# Patient Record
Sex: FEMALE | Race: AMERICAN INDIAN OR ALASKA NATIVE | Employment: UNEMPLOYED | URBAN - METROPOLITAN AREA
[De-identification: names, ages, dates, MRNs, and addresses within clinical notes are randomized per-mention and may not be internally consistent; named-entity substitution may affect disease eponyms.]

---

## 2017-03-09 ENCOUNTER — HOSPITAL ENCOUNTER (EMERGENCY)
Facility: CLINIC | Age: 37
Discharge: LEFT BEFORE DISCHARGE | End: 2017-03-09
Attending: EMERGENCY MEDICINE | Admitting: EMERGENCY MEDICINE

## 2017-03-09 VITALS
RESPIRATION RATE: 16 BRPM | SYSTOLIC BLOOD PRESSURE: 165 MMHG | TEMPERATURE: 98 F | BODY MASS INDEX: 43.58 KG/M2 | DIASTOLIC BLOOD PRESSURE: 92 MMHG | HEART RATE: 82 BPM | OXYGEN SATURATION: 97 % | WEIGHT: 270 LBS

## 2017-03-09 DIAGNOSIS — L03.211 FACIAL CELLULITIS: ICD-10-CM

## 2017-03-09 DIAGNOSIS — K08.89 PAIN, DENTAL: ICD-10-CM

## 2017-03-09 LAB
ANION GAP SERPL CALCULATED.3IONS-SCNC: 8 MMOL/L (ref 3–14)
BASOPHILS # BLD AUTO: 0 10E9/L (ref 0–0.2)
BASOPHILS NFR BLD AUTO: 0.1 %
BUN SERPL-MCNC: 8 MG/DL (ref 7–30)
CALCIUM SERPL-MCNC: 8.7 MG/DL (ref 8.5–10.1)
CHLORIDE SERPL-SCNC: 105 MMOL/L (ref 94–109)
CO2 SERPL-SCNC: 27 MMOL/L (ref 20–32)
CREAT SERPL-MCNC: 0.5 MG/DL (ref 0.52–1.04)
DIFFERENTIAL METHOD BLD: NORMAL
EOSINOPHIL # BLD AUTO: 0.2 10E9/L (ref 0–0.7)
EOSINOPHIL NFR BLD AUTO: 3.5 %
ERYTHROCYTE [DISTWIDTH] IN BLOOD BY AUTOMATED COUNT: 12.6 % (ref 10–15)
GFR SERPL CREATININE-BSD FRML MDRD: ABNORMAL ML/MIN/1.7M2
GLUCOSE SERPL-MCNC: 111 MG/DL (ref 70–99)
HCT VFR BLD AUTO: 40.6 % (ref 35–47)
HGB BLD-MCNC: 14.4 G/DL (ref 11.7–15.7)
IMM GRANULOCYTES # BLD: 0 10E9/L (ref 0–0.4)
IMM GRANULOCYTES NFR BLD: 0.1 %
LYMPHOCYTES # BLD AUTO: 1.2 10E9/L (ref 0.8–5.3)
LYMPHOCYTES NFR BLD AUTO: 18.1 %
MCH RBC QN AUTO: 32.4 PG (ref 26.5–33)
MCHC RBC AUTO-ENTMCNC: 35.5 G/DL (ref 31.5–36.5)
MCV RBC AUTO: 91 FL (ref 78–100)
MONOCYTES # BLD AUTO: 0.6 10E9/L (ref 0–1.3)
MONOCYTES NFR BLD AUTO: 8.2 %
NEUTROPHILS # BLD AUTO: 4.7 10E9/L (ref 1.6–8.3)
NEUTROPHILS NFR BLD AUTO: 70 %
NRBC # BLD AUTO: 0 10*3/UL
NRBC BLD AUTO-RTO: 0 /100
PLATELET # BLD AUTO: 247 10E9/L (ref 150–450)
POTASSIUM SERPL-SCNC: 3.3 MMOL/L (ref 3.4–5.3)
RBC # BLD AUTO: 4.45 10E12/L (ref 3.8–5.2)
SODIUM SERPL-SCNC: 140 MMOL/L (ref 133–144)
WBC # BLD AUTO: 6.8 10E9/L (ref 4–11)

## 2017-03-09 PROCEDURE — 99285 EMERGENCY DEPT VISIT HI MDM: CPT | Mod: 25

## 2017-03-09 PROCEDURE — 85025 COMPLETE CBC W/AUTO DIFF WBC: CPT | Performed by: EMERGENCY MEDICINE

## 2017-03-09 PROCEDURE — 80048 BASIC METABOLIC PNL TOTAL CA: CPT | Performed by: EMERGENCY MEDICINE

## 2017-03-09 PROCEDURE — 25000128 H RX IP 250 OP 636: Performed by: EMERGENCY MEDICINE

## 2017-03-09 PROCEDURE — 96374 THER/PROPH/DIAG INJ IV PUSH: CPT

## 2017-03-09 PROCEDURE — 96375 TX/PRO/DX INJ NEW DRUG ADDON: CPT

## 2017-03-09 RX ORDER — MORPHINE SULFATE 4 MG/ML
4 INJECTION, SOLUTION INTRAMUSCULAR; INTRAVENOUS
Status: DISCONTINUED | OUTPATIENT
Start: 2017-03-09 | End: 2017-03-09 | Stop reason: HOSPADM

## 2017-03-09 RX ORDER — IOPAMIDOL 755 MG/ML
90 INJECTION, SOLUTION INTRAVASCULAR ONCE
Status: DISCONTINUED | OUTPATIENT
Start: 2017-03-09 | End: 2017-03-09 | Stop reason: HOSPADM

## 2017-03-09 RX ORDER — KETOROLAC TROMETHAMINE 30 MG/ML
30 INJECTION, SOLUTION INTRAMUSCULAR; INTRAVENOUS ONCE
Status: COMPLETED | OUTPATIENT
Start: 2017-03-09 | End: 2017-03-09

## 2017-03-09 RX ORDER — AMPICILLIN AND SULBACTAM 2; 1 G/1; G/1
3 INJECTION, POWDER, FOR SOLUTION INTRAMUSCULAR; INTRAVENOUS ONCE
Status: DISCONTINUED | OUTPATIENT
Start: 2017-03-09 | End: 2017-03-09 | Stop reason: HOSPADM

## 2017-03-09 RX ADMIN — KETOROLAC TROMETHAMINE 30 MG: 30 INJECTION, SOLUTION INTRAMUSCULAR at 02:26

## 2017-03-09 RX ADMIN — MORPHINE SULFATE 4 MG: 4 INJECTION, SOLUTION INTRAMUSCULAR; INTRAVENOUS at 02:31

## 2017-03-09 ASSESSMENT — ENCOUNTER SYMPTOMS
EYE PAIN: 0
TROUBLE SWALLOWING: 0
SHORTNESS OF BREATH: 0
FACIAL SWELLING: 1

## 2017-03-09 NOTE — ED NOTES
"Attempted to administer antibiotics.  Patient has been on cellphone entire time.  Requesting writer to talk on phone to her Auntie who she says is in the medical field.  States \"I don't want any tests done.  This is crap.  I don't have anything wrong with me.  It is just an abscess.  Just give me the antibiotics so I can go but first you need to talk to her so she can tell you how to treat me cause she has been treating me at home\".  Writer declined to speak on cell phone.  Explained I will discuss her plan of care with her only as she is the patient.    Of note - Patient has been restless & fidgety during entire stay in ED.  Declined to change into patient gown.  States, \"I refuse.  Just start an IV right here, this is where they always put it\"  (points to left hand).  "
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
## 2017-03-09 NOTE — ED NOTES
IV catheter lying on bedside table & patient is not in room.  All belongings are gone as well.  Patient was not observed leaving the ED department.

## 2017-06-10 ENCOUNTER — HEALTH MAINTENANCE LETTER (OUTPATIENT)
Age: 37
End: 2017-06-10

## 2019-09-30 ENCOUNTER — HEALTH MAINTENANCE LETTER (OUTPATIENT)
Age: 39
End: 2019-09-30

## 2021-01-15 ENCOUNTER — HEALTH MAINTENANCE LETTER (OUTPATIENT)
Age: 41
End: 2021-01-15

## 2021-10-24 ENCOUNTER — HEALTH MAINTENANCE LETTER (OUTPATIENT)
Age: 41
End: 2021-10-24

## 2022-02-13 ENCOUNTER — HEALTH MAINTENANCE LETTER (OUTPATIENT)
Age: 42
End: 2022-02-13

## 2022-10-15 ENCOUNTER — HEALTH MAINTENANCE LETTER (OUTPATIENT)
Age: 42
End: 2022-10-15

## 2023-03-26 ENCOUNTER — HEALTH MAINTENANCE LETTER (OUTPATIENT)
Age: 43
End: 2023-03-26

## 2024-07-24 ENCOUNTER — TRANSFERRED RECORDS (OUTPATIENT)
Dept: HEALTH INFORMATION MANAGEMENT | Facility: CLINIC | Age: 44
End: 2024-07-24

## 2024-10-04 ENCOUNTER — OFFICE VISIT (OUTPATIENT)
Dept: URGENT CARE | Facility: URGENT CARE | Age: 44
End: 2024-10-04
Payer: COMMERCIAL

## 2024-10-04 VITALS
OXYGEN SATURATION: 95 % | TEMPERATURE: 98.7 F | DIASTOLIC BLOOD PRESSURE: 99 MMHG | WEIGHT: 274 LBS | RESPIRATION RATE: 18 BRPM | BODY MASS INDEX: 44.22 KG/M2 | HEART RATE: 87 BPM | SYSTOLIC BLOOD PRESSURE: 163 MMHG

## 2024-10-04 DIAGNOSIS — I80.8 SUPERFICIAL PHLEBITIS OF ARM: Primary | ICD-10-CM

## 2024-10-04 DIAGNOSIS — F19.90 ACTIVE INTRAVENOUS DRUG USE: ICD-10-CM

## 2024-10-04 PROCEDURE — 99204 OFFICE O/P NEW MOD 45 MIN: CPT | Performed by: FAMILY MEDICINE

## 2024-10-04 RX ORDER — SULFAMETHOXAZOLE AND TRIMETHOPRIM 800; 160 MG/1; MG/1
1 TABLET ORAL 2 TIMES DAILY
Qty: 10 TABLET | Refills: 0 | Status: SHIPPED | OUTPATIENT
Start: 2024-10-04 | End: 2024-10-09
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
## 2024-10-13 ENCOUNTER — HEALTH MAINTENANCE LETTER (OUTPATIENT)
Age: 44
End: 2024-10-13

## 2024-12-31 LAB — PAP SMEAR - HIM PATIENT REPORTED: NORMAL

## 2025-01-01 ENCOUNTER — TRANSFERRED RECORDS (OUTPATIENT)
Dept: MULTI SPECIALTY CLINIC | Facility: CLINIC | Age: 45
End: 2025-01-01

## 2025-01-01 LAB — RETINOPATHY: NORMAL

## 2025-02-25 ENCOUNTER — OFFICE VISIT (OUTPATIENT)
Dept: URGENT CARE | Facility: URGENT CARE | Age: 45
End: 2025-02-25
Payer: COMMERCIAL

## 2025-02-25 VITALS
TEMPERATURE: 98.3 F | WEIGHT: 274 LBS | RESPIRATION RATE: 18 BRPM | DIASTOLIC BLOOD PRESSURE: 68 MMHG | SYSTOLIC BLOOD PRESSURE: 141 MMHG | HEART RATE: 76 BPM | BODY MASS INDEX: 44.22 KG/M2 | OXYGEN SATURATION: 97 %

## 2025-02-25 DIAGNOSIS — R20.0 NUMBNESS AND TINGLING OF BOTH FEET: ICD-10-CM

## 2025-02-25 DIAGNOSIS — R20.2 NUMBNESS AND TINGLING OF BOTH FEET: ICD-10-CM

## 2025-02-25 DIAGNOSIS — L08.9 TOE INFECTION: Primary | ICD-10-CM

## 2025-02-25 PROCEDURE — 99213 OFFICE O/P EST LOW 20 MIN: CPT

## 2025-02-25 PROCEDURE — 3077F SYST BP >= 140 MM HG: CPT

## 2025-02-25 PROCEDURE — 3078F DIAST BP <80 MM HG: CPT

## 2025-02-25 RX ORDER — CAPSAICIN 0.025 %
1 CREAM (GRAM) TOPICAL 3 TIMES DAILY
Qty: 120 G | Refills: 0 | Status: SHIPPED | OUTPATIENT
Start: 2025-02-25

## 2025-02-25 RX ORDER — DOXYCYCLINE HYCLATE 100 MG
100 TABLET ORAL 2 TIMES DAILY
Qty: 14 TABLET | Refills: 0 | Status: SHIPPED | OUTPATIENT
Start: 2025-02-25 | End: 2025-03-04

## 2025-02-25 NOTE — PROGRESS NOTES
"Assessment & Plan   (L08.9) Toe infection  (primary encounter diagnosis)  Plan: doxycycline hyclate (VIBRA-TABS) 100 MG tablet,        Orthopedic  Referral    (R20.0,  R20.2) Numbness and tingling of both feet  Plan: capsaicin (ZOSTRIX) 0.025 % external cream    Informed the patient to take the antibiotic as prescribed and finish the full course even if symptoms improve.  We discussed trying yogurt with active cultures or probiotic such as Culturelle daily to help with the diarrhea while taking the antibiotic.  We also discussed using the capsaicin cream as needed for the numbness and tingling in the feet.  Informed the patient to follow-up with podiatry for further evaluation and treatment.  Patient acknowledged her understanding of the above plan.    The use of Dragon/[employer] services may have been used to construct the content in this note; any grammatical or spelling errors are non-intentional. Please contact the author of this note directly if you are in need of any clarification.      DINH Shepard [facility] U[facility]ENT [facility] SIMBA Casillas is a 45 year old female who presents to clinic today for the following health issues:  Chief Complaint   Patient presents with    Urgent Care    Foot Problems     Hx of neuropathy of foot, Pt concerned about gangrene on bilateral toes, discoloration, swelling and pain since December   No history of diabetes   Hx of alcoholism     HPI  Patient has had \"nerve pain\" on her feet since December.  More recently she has noticed blisters on her bilateral big toes and is concerned about possible infection.    ROS:  Negative except noted above.    Review of Systems        Objective    BP (!) 141/68 (BP Location: Left arm, Patient Position: Sitting, Cuff Size: Adult Large)   Pulse 76   Temp 98.3  F (36.8  C) (Tympanic)   Resp 18   Wt 124.3 kg (274 lb)   LMP 02/15/2025 (Within Days)   SpO2 97%   Breastfeeding No   BMI " 44.22 kg/m    Physical Exam   GENERAL: alert and no distress  SKIN: blistering on the distal aspect of bilateral toes.  CMS intact

## 2025-02-25 NOTE — PATIENT INSTRUCTIONS
Take the antibiotic as prescribed and finish the full course even if symptoms improve.  Try yogurt with active cultures or probiotics such as Culturelle daily to help prevent diarrhea while using antibiotics.  Use the capsaicin cream as needed for the numbness and tingling in the feet.  Follow up with podiatry for further evaluation and treatment.

## 2025-02-27 ENCOUNTER — OFFICE VISIT (OUTPATIENT)
Dept: PODIATRY | Facility: CLINIC | Age: 45
End: 2025-02-27
Payer: COMMERCIAL

## 2025-02-27 VITALS — HEART RATE: 97 BPM | BODY MASS INDEX: 44.22 KG/M2 | OXYGEN SATURATION: 98 % | WEIGHT: 274 LBS

## 2025-02-27 DIAGNOSIS — M32.9 SYSTEMIC LUPUS ERYTHEMATOSUS, UNSPECIFIED SLE TYPE, UNSPECIFIED ORGAN INVOLVEMENT STATUS (H): ICD-10-CM

## 2025-02-27 DIAGNOSIS — R23.4 ESCHAR: ICD-10-CM

## 2025-02-27 DIAGNOSIS — E11.9 TYPE 2 DIABETES MELLITUS WITHOUT COMPLICATION, WITHOUT LONG-TERM CURRENT USE OF INSULIN (H): Primary | ICD-10-CM

## 2025-02-27 PROBLEM — K21.9 GASTROESOPHAGEAL REFLUX DISEASE WITHOUT ESOPHAGITIS: Status: ACTIVE | Noted: 2021-01-12

## 2025-02-27 PROBLEM — L03.113 RIGHT ARM CELLULITIS: Status: ACTIVE | Noted: 2024-03-02

## 2025-02-27 PROBLEM — T14.91XA SUICIDE ATTEMPT (H): Status: ACTIVE | Noted: 2021-01-12

## 2025-02-27 PROBLEM — M25.561 CHRONIC PAIN OF BOTH KNEES: Status: ACTIVE | Noted: 2020-11-16

## 2025-02-27 PROBLEM — F25.0 SCHIZOAFFECTIVE DISORDER, BIPOLAR TYPE (H): Status: ACTIVE | Noted: 2017-05-21

## 2025-02-27 PROBLEM — F32.A DEPRESSION, ACUTE: Status: ACTIVE | Noted: 2017-05-21

## 2025-02-27 PROBLEM — G47.33 OSA (OBSTRUCTIVE SLEEP APNEA): Status: ACTIVE | Noted: 2021-01-12

## 2025-02-27 PROBLEM — N05.2 MEMBRANOUS GLOMERULONEPHRITIS: Status: ACTIVE | Noted: 2019-05-01

## 2025-02-27 PROBLEM — M25.562 CHRONIC PAIN OF BOTH KNEES: Status: ACTIVE | Noted: 2020-11-16

## 2025-02-27 PROBLEM — G89.29 CHRONIC PAIN OF BOTH KNEES: Status: ACTIVE | Noted: 2020-11-16

## 2025-02-27 PROBLEM — R52 INTRACTABLE PAIN: Status: ACTIVE | Noted: 2021-03-14

## 2025-02-27 PROBLEM — F15.10 METHAMPHETAMINE USE (H): Status: ACTIVE | Noted: 2024-03-01

## 2025-02-27 PROBLEM — L03.90 CELLULITIS: Status: ACTIVE | Noted: 2024-03-01

## 2025-02-27 RX ORDER — OMEPRAZOLE 20 MG/1
20 CAPSULE, DELAYED RELEASE ORAL
COMMUNITY

## 2025-02-27 RX ORDER — VITAMIN B COMPLEX
2000 TABLET ORAL DAILY
COMMUNITY

## 2025-02-27 RX ORDER — AMLODIPINE BESYLATE 5 MG/1
5 TABLET ORAL DAILY
COMMUNITY

## 2025-02-27 NOTE — PATIENT INSTRUCTIONS
We wish you continued good healing. If you have any questions or concerns, please do not hesitate to contact us at  [phone]    [employer] (secure e-mail communication and access to your chart) to send a message or to make an appointment.    Please remember to call and schedule a follow up appointment if one was recommended at your earliest convenience.     PODIATRY CLINIC HOURS  TELEPHONE NUMBER    Dr. Nick SOLISPOSCAR FACFAS        Clinics:  [facility]   [place]  Tuesday 1PM-6PM  [place]  Wednesday 745AM-330PM  [place]  Monday 2nd,4th  830AM-4PM  Thursday/Friday 745AM-230PM     [place] APPOINTMENTS  [phone]    [place] APPOINTMENTS  [phone]          If you need a medication refill, please contact us you may need lab work and/or a follow up visit prior to your refill (i.e. Antifungal medications).  If MRI needed please call Imaging at [phone]   HOW DO I GET MY KNEE SCOOTER? Knee scooters can be picked up at ANY Medical Supply stores with your knee scooter Prescription.  OR  Bring your signed prescription to an [facility] showroom.   Set up an appointment for your custom Orthotics. Call any Orthotics locations call [phone]
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
## 2025-03-05 ENCOUNTER — OFFICE VISIT (OUTPATIENT)
Dept: FAMILY MEDICINE | Facility: CLINIC | Age: 45
End: 2025-03-05
Payer: COMMERCIAL

## 2025-03-05 DIAGNOSIS — Z53.9 NO SHOW: Primary | ICD-10-CM

## 2025-03-05 PROCEDURE — 99207 PR NO SHOW FOR SCHEDULED APPT: CPT | Performed by: INTERNAL MEDICINE

## 2025-04-04 ENCOUNTER — ORDERS ONLY (AUTO-RELEASED) (OUTPATIENT)
Dept: FAMILY MEDICINE | Facility: CLINIC | Age: 45
End: 2025-04-04
Payer: COMMERCIAL

## 2025-04-04 DIAGNOSIS — Z12.11 SCREEN FOR COLON CANCER: ICD-10-CM

## 2025-04-12 ENCOUNTER — HEALTH MAINTENANCE LETTER (OUTPATIENT)
Age: 45
End: 2025-04-12

## 2025-05-03 ENCOUNTER — OFFICE VISIT (OUTPATIENT)
Dept: URGENT CARE | Facility: URGENT CARE | Age: 45
End: 2025-05-03
Payer: COMMERCIAL

## 2025-05-03 ENCOUNTER — ANCILLARY PROCEDURE (OUTPATIENT)
Dept: GENERAL RADIOLOGY | Facility: CLINIC | Age: 45
End: 2025-05-03
Attending: PHYSICIAN ASSISTANT
Payer: COMMERCIAL

## 2025-05-03 VITALS
SYSTOLIC BLOOD PRESSURE: 156 MMHG | TEMPERATURE: 97.1 F | DIASTOLIC BLOOD PRESSURE: 91 MMHG | OXYGEN SATURATION: 98 % | HEART RATE: 69 BPM

## 2025-05-03 DIAGNOSIS — M17.12 PRIMARY OSTEOARTHRITIS OF LEFT KNEE: ICD-10-CM

## 2025-05-03 DIAGNOSIS — M25.562 CHRONIC PAIN OF LEFT KNEE: Primary | ICD-10-CM

## 2025-05-03 DIAGNOSIS — G89.29 CHRONIC PAIN OF LEFT KNEE: Primary | ICD-10-CM

## 2025-05-03 PROCEDURE — 3077F SYST BP >= 140 MM HG: CPT | Performed by: PHYSICIAN ASSISTANT

## 2025-05-03 PROCEDURE — 73562 X-RAY EXAM OF KNEE 3: CPT | Mod: TC | Performed by: RADIOLOGY

## 2025-05-03 PROCEDURE — 99214 OFFICE O/P EST MOD 30 MIN: CPT | Performed by: PHYSICIAN ASSISTANT

## 2025-05-03 PROCEDURE — 3080F DIAST BP >= 90 MM HG: CPT | Performed by: PHYSICIAN ASSISTANT

## 2025-05-03 NOTE — PROGRESS NOTES
Urgent Care Clinic Visit    Chief Complaint   Patient presents with    Musculoskeletal Problem     Left knee pain for 5 days, pain getting worse. Hx of fall about 2 years ago              5/3/2025    11:29 AM   Additional Questions   Roomed by Prema   Accompanied by self

## 2025-05-03 NOTE — PROGRESS NOTES
SUBJECTIVE:   Corinna J Schoen is a 45 year old female presenting with a chief complaint of   Chief Complaint   Patient presents with    Musculoskeletal Problem     Left knee pain for 5 days, pain getting worse. Hx of fall about 2 years ago       She is an established patient of [facility].  Patient presents in a wheel chair stating painful to walk.  Onset 5 days ago without trama.        Review of Systems    Past Medical History:   Diagnosis Date    Asthma     Depressive disorder     H/O factor V Leiden mutation     Not confirmed, pt states she had to take coumadin for it    Hypertension     Lupus (systemic lupus erythematosus) (H)     Morbid obesity (H)     Neuromuscular disorder (H)     Pulmonary embolism (H) 1999, 2003     Family History   Problem Relation Age of Onset    Chronic Obstructive Pulmonary Disease Mother     Diabetes Mother     Lung Cancer Mother         Mesothelioma    Coronary Artery Disease Mother     Kidney Disease Father      Current Outpatient Medications   Medication Sig Dispense Refill    diclofenac (VOLTAREN) 1 % topical gel Apply 4 g topically 4 times daily. 50 g 0     Social History     Tobacco Use    Smoking status: Every Day     Types: Cigarettes    Smokeless tobacco: Not on file    Tobacco comments:     3-4 cigerettes per day   Substance Use Topics    Alcohol use: No     Alcohol/week: 0.0 standard drinks of alcohol       OBJECTIVE  BP (!) 156/91   Pulse 69   Temp 97.1  F (36.2  C) (Tympanic)   LMP  (LMP Unknown)   SpO2 98%     Physical Exam  Vitals reviewed.   Constitutional:       Appearance: Normal appearance. She is obese.   Cardiovascular:      Rate and Rhythm: Normal rate.   Musculoskeletal:      Comments: Left knee:  many scars noted.  No effusion.  Negative sisi.  Tenderness to palpation of medial joint line.   Neurological:      General: No focal deficit present.      Mental Status: She is alert.   Psychiatric:         Mood and Affect: Mood normal.         Labs:  No results  found for this or any previous visit (from the past 24 hours).    X-Ray was done, my findings are: Xrays reviewed by myself and independently interpreted.  Any significant discrepancies with official radiologic read, patient will be notified.      DJD    ASSESSMENT:      ICD-10-CM    1. Chronic pain of left knee  M25.562 XR Knee Left 3 Views    G89.29 diclofenac (VOLTAREN) 1 % topical gel     Orthopedic  Referral      2. Primary osteoarthritis of left knee  M17.12            Medical Decision Making:    Differential Diagnosis:  MS Injury Pain: osteoarthritis, medial meniscal tear    Serious Comorbid Conditions:  Adult:   reviewed    PLAN:    Rx for voltaren gel.  Ortho referral.  Discussed reasons to seek immediate medical attention.  Additionally if no improvement or worsening in one week, may follow up with PCP and/or UC.        Followup:    If not improving or if condition worsens, follow up with your Primary Care Provider, In 5  day(s) follow up with  Ortho    There are no Patient Instructions on file for this visit.

## 2025-05-06 ENCOUNTER — ANCILLARY PROCEDURE (OUTPATIENT)
Dept: GENERAL RADIOLOGY | Facility: CLINIC | Age: 45
End: 2025-05-06
Attending: STUDENT IN AN ORGANIZED HEALTH CARE EDUCATION/TRAINING PROGRAM
Payer: COMMERCIAL

## 2025-05-06 ENCOUNTER — OFFICE VISIT (OUTPATIENT)
Dept: ORTHOPEDICS | Facility: CLINIC | Age: 45
End: 2025-05-06
Payer: COMMERCIAL

## 2025-05-06 ENCOUNTER — PRE VISIT (OUTPATIENT)
Dept: ORTHOPEDICS | Facility: CLINIC | Age: 45
End: 2025-05-06

## 2025-05-06 DIAGNOSIS — M54.50 CHRONIC LOW BACK PAIN, UNSPECIFIED BACK PAIN LATERALITY, UNSPECIFIED WHETHER SCIATICA PRESENT: ICD-10-CM

## 2025-05-06 DIAGNOSIS — M17.12 PRIMARY OSTEOARTHRITIS OF LEFT KNEE: Primary | ICD-10-CM

## 2025-05-06 DIAGNOSIS — G89.29 CHRONIC LOW BACK PAIN, UNSPECIFIED BACK PAIN LATERALITY, UNSPECIFIED WHETHER SCIATICA PRESENT: ICD-10-CM

## 2025-05-06 DIAGNOSIS — M25.562 LEFT KNEE PAIN: ICD-10-CM

## 2025-05-06 DIAGNOSIS — M25.562 LEFT KNEE PAIN: Primary | ICD-10-CM

## 2025-05-06 PROCEDURE — 73560 X-RAY EXAM OF KNEE 1 OR 2: CPT | Mod: LT | Performed by: RADIOLOGY

## 2025-05-06 RX ORDER — LIDOCAINE HYDROCHLORIDE 10 MG/ML
4 INJECTION, SOLUTION EPIDURAL; INFILTRATION; INTRACAUDAL; PERINEURAL
Status: COMPLETED | OUTPATIENT
Start: 2025-05-06 | End: 2025-05-06

## 2025-05-06 RX ORDER — TRIAMCINOLONE ACETONIDE 40 MG/ML
40 INJECTION, SUSPENSION INTRA-ARTICULAR; INTRAMUSCULAR
Status: COMPLETED | OUTPATIENT
Start: 2025-05-06 | End: 2025-05-06

## 2025-05-06 RX ADMIN — TRIAMCINOLONE ACETONIDE 40 MG: 40 INJECTION, SUSPENSION INTRA-ARTICULAR; INTRAMUSCULAR at 12:23

## 2025-05-06 RX ADMIN — LIDOCAINE HYDROCHLORIDE 4 ML: 10 INJECTION, SOLUTION EPIDURAL; INFILTRATION; INTRACAUDAL; PERINEURAL at 12:23

## 2025-05-06 NOTE — NURSING NOTE
[address] [address]  Dept: [phone]  ______________________________________________________________________________    Patient: Corinna J Schoen   : 1980   MRN: [id number]   May 6, 2025    INVASIVE PROCEDURE SAFETY CHECKLIST    Date: May 6, 2025    Procedure:Left Knee CSI [place]  Patient Name: Corinna J Schoen  MRN: [id number]  YOB: 1980    Action: Complete sections as appropriate. Any discrepancy results in a HARD COPY until resolved.     PRE PROCEDURE:  Patient ID verified with 2 identifiers (name and  or MRN): Yes  Procedure and site verified with patient/designee (when able): Yes  Accurate consent documentation in medical record: Yes  H&P (or appropriate assessment) documented in medical record: Yes  H&P must be up to 20 days prior to procedure and updates within 24 hours of procedure as applicable: NA  Relevant diagnostic and radiology test results appropriately labeled and displayed as applicable: Yes  Procedure site(s) marked with provider initials: NA    TIMEOUT:  Time-Out performed immediately prior to starting procedure, including verbal and active participation of all team members addressing the following:Yes  * Correct patient identify  * Confirmed that the correct side and site are marked  * An accurate procedure consent form  * Agreement on the procedure to be done  * Correct patient position  * Relevant images and results are properly labeled and appropriately displayed  * The need to administer antibiotics or fluids for irrigation purposes during the procedure as applicable   * Safety precautions based on patient history or medication use    DURING PROCEDURE: Verification of correct person, site, and procedures any time the responsibility for care of the patient is transferred to another member of the care team.       Prior to injection, verified patient identity using patient's name and date of  birth.  Due to injection administration, patient instructed to remain in clinic for 15 minutes  afterwards, and to report any adverse reaction to me immediately.    Joint injection was performed.      Drug Amount Wasted:  Yes: 1 mg/ml   Vial/Syringe: Single dose vial  Expiration Date:  10/2028      Eli Ponce ATC  May 6, 2025
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
## 2025-05-06 NOTE — TELEPHONE ENCOUNTER
Action 5/6/25 jm   Action Taken Faxed imaging request to [facility]  Imaging received and resolved to [facility].       DIAGNOSIS: Urgent Care Follow Up: LEFT knee pain / Kaylynn Guthrie PA in  URGENT CARE / [facility] / Imaging ON FILE     APPOINTMENT DATE: 5/6/25   NOTES STATUS DETAILS   DISCHARGE REPORT from the ER Internal/CE 5/3/25  [place]  [facility]    11/24/24  [place]  [facility]   MEDICATION LIST Internal    XRAYS (IMAGES & REPORTS) Internal 5/3/25, 11.24.24  XR Knee Left

## 2025-05-16 ENCOUNTER — TELEPHONE (OUTPATIENT)
Dept: ORTHOPEDICS | Facility: CLINIC | Age: 45
End: 2025-05-16

## 2025-05-16 ENCOUNTER — HOSPITAL ENCOUNTER (EMERGENCY)
Facility: CLINIC | Age: 45
Discharge: HOME | End: 2025-05-16
Attending: STUDENT IN AN ORGANIZED HEALTH CARE EDUCATION/TRAINING PROGRAM | Admitting: STUDENT IN AN ORGANIZED HEALTH CARE EDUCATION/TRAINING PROGRAM
Payer: COMMERCIAL

## 2025-05-16 ENCOUNTER — APPOINTMENT (OUTPATIENT)
Dept: GENERAL RADIOLOGY | Facility: CLINIC | Age: 45
End: 2025-05-16
Attending: STUDENT IN AN ORGANIZED HEALTH CARE EDUCATION/TRAINING PROGRAM
Payer: COMMERCIAL

## 2025-05-16 VITALS
WEIGHT: 267 LBS | HEIGHT: 66 IN | SYSTOLIC BLOOD PRESSURE: 188 MMHG | DIASTOLIC BLOOD PRESSURE: 107 MMHG | TEMPERATURE: 98.2 F | OXYGEN SATURATION: 96 % | BODY MASS INDEX: 42.91 KG/M2 | HEART RATE: 82 BPM | RESPIRATION RATE: 18 BRPM

## 2025-05-16 DIAGNOSIS — M25.562 CHRONIC PAIN OF LEFT KNEE: ICD-10-CM

## 2025-05-16 DIAGNOSIS — G89.29 CHRONIC PAIN OF LEFT KNEE: ICD-10-CM

## 2025-05-16 LAB
ANION GAP SERPL CALCULATED.3IONS-SCNC: 11 MMOL/L (ref 7–15)
BASOPHILS # BLD AUTO: 0 10E3/UL (ref 0–0.2)
BASOPHILS NFR BLD AUTO: 1 %
BUN SERPL-MCNC: 12.9 MG/DL (ref 6–20)
CALCIUM SERPL-MCNC: 9 MG/DL (ref 8.8–10.4)
CHLORIDE SERPL-SCNC: 102 MMOL/L (ref 98–107)
CREAT SERPL-MCNC: 0.52 MG/DL (ref 0.51–0.95)
EGFRCR SERPLBLD CKD-EPI 2021: >90 ML/MIN/1.73M2
EOSINOPHIL # BLD AUTO: 0.3 10E3/UL (ref 0–0.7)
EOSINOPHIL NFR BLD AUTO: 4 %
ERYTHROCYTE [DISTWIDTH] IN BLOOD BY AUTOMATED COUNT: 13.1 % (ref 10–15)
GLUCOSE SERPL-MCNC: 93 MG/DL (ref 70–99)
HCG SERPL QL: NEGATIVE
HCO3 SERPL-SCNC: 24 MMOL/L (ref 22–29)
HCT VFR BLD AUTO: 43.3 % (ref 35–47)
HGB BLD-MCNC: 14.6 G/DL (ref 11.7–15.7)
IMM GRANULOCYTES # BLD: 0 10E3/UL
IMM GRANULOCYTES NFR BLD: 0 %
LYMPHOCYTES # BLD AUTO: 1.1 10E3/UL (ref 0.8–5.3)
LYMPHOCYTES NFR BLD AUTO: 14 %
MCH RBC QN AUTO: 30 PG (ref 26.5–33)
MCHC RBC AUTO-ENTMCNC: 33.7 G/DL (ref 31.5–36.5)
MCV RBC AUTO: 89 FL (ref 78–100)
MONOCYTES # BLD AUTO: 0.5 10E3/UL (ref 0–1.3)
MONOCYTES NFR BLD AUTO: 6 %
NEUTROPHILS # BLD AUTO: 6.1 10E3/UL (ref 1.6–8.3)
NEUTROPHILS NFR BLD AUTO: 76 %
NRBC # BLD AUTO: 0 10E3/UL
NRBC BLD AUTO-RTO: 0 /100
PLATELET # BLD AUTO: 304 10E3/UL (ref 150–450)
POTASSIUM SERPL-SCNC: 3.8 MMOL/L (ref 3.4–5.3)
RBC # BLD AUTO: 4.87 10E6/UL (ref 3.8–5.2)
SODIUM SERPL-SCNC: 137 MMOL/L (ref 135–145)
WBC # BLD AUTO: 8.1 10E3/UL (ref 4–11)

## 2025-05-16 PROCEDURE — 84703 CHORIONIC GONADOTROPIN ASSAY: CPT | Performed by: STUDENT IN AN ORGANIZED HEALTH CARE EDUCATION/TRAINING PROGRAM

## 2025-05-16 PROCEDURE — 73560 X-RAY EXAM OF KNEE 1 OR 2: CPT | Mod: 26 | Performed by: RADIOLOGY

## 2025-05-16 PROCEDURE — 85004 AUTOMATED DIFF WBC COUNT: CPT | Performed by: STUDENT IN AN ORGANIZED HEALTH CARE EDUCATION/TRAINING PROGRAM

## 2025-05-16 PROCEDURE — 73560 X-RAY EXAM OF KNEE 1 OR 2: CPT | Mod: LT

## 2025-05-16 PROCEDURE — 99284 EMERGENCY DEPT VISIT MOD MDM: CPT

## 2025-05-16 PROCEDURE — 36415 COLL VENOUS BLD VENIPUNCTURE: CPT | Performed by: STUDENT IN AN ORGANIZED HEALTH CARE EDUCATION/TRAINING PROGRAM

## 2025-05-16 PROCEDURE — 99284 EMERGENCY DEPT VISIT MOD MDM: CPT | Performed by: STUDENT IN AN ORGANIZED HEALTH CARE EDUCATION/TRAINING PROGRAM

## 2025-05-16 PROCEDURE — 80048 BASIC METABOLIC PNL TOTAL CA: CPT | Performed by: STUDENT IN AN ORGANIZED HEALTH CARE EDUCATION/TRAINING PROGRAM

## 2025-05-16 RX ORDER — CAPSAICIN 0.025 %
1 CREAM (GRAM) TOPICAL 3 TIMES DAILY PRN
Qty: 120 G | Refills: 0 | Status: SHIPPED | OUTPATIENT
Start: 2025-05-16

## 2025-05-16 ASSESSMENT — ACTIVITIES OF DAILY LIVING (ADL)
ADLS_ACUITY_SCORE: 41

## 2025-05-16 NOTE — TELEPHONE ENCOUNTER
Other: Patient had injection last week, she is in a lot of pain and has had swelling in the L leg for that last few days, feels that it is affecting entire L side would like to speak to care team asap      Could we send this information to you [facility] or would you prefer to receive a phone call?:   Patient would prefer a phone call   Okay to leave a detailed message?: Yes at Cell number on file:    Telephone Information:   Mobile [phone]

## 2025-05-16 NOTE — ED TRIAGE NOTES
Patient to triage with c/o knee pain, leg pain and swelling. Patient reported this has been ongoing for the last couple of weeks. She was seen in an Orthopedic clinic and was advised for a knee replacement.
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
## 2025-05-17 NOTE — TELEPHONE ENCOUNTER
Talked with patient and discussed that she does not need to wear the knee immobilizer from the hospital. She can use crutches as needed if she has pain walking. I told her it can take up to 2 weeks for a steroid injection to work, she is not 2 weeks out from an injection. Offered a follow up visit with Dr. Will, but she wanted me to pass on the message that her left knee is in a lot of pain and it is swollen. No signs of infection. Discussed RICE for pain management and swelling.

## 2025-05-17 NOTE — DISCHARGE INSTRUCTIONS
You were seen in the emergency room for leg pain and your x-ray was reassuring  Please call your primary care doctor for follow-up visit in the next 2 to 3 days  In case you have an injury to one of your ligaments we are giving you a knee immobilizer to go home with as well as some crutches, and I recommend that you use Tylenol and ibuprofen for the pain as well as keep your leg elevated if it hurts too much.  We have made you a follow-up visit with orthopedic surgery, they should be giving you a call within the next few weeks  If you have any new or worsening symptoms including but not limited to pain, swelling, fevers, chills please return immediately to the emergency department.

## 2025-05-19 ENCOUNTER — MYC MEDICAL ADVICE (OUTPATIENT)
Dept: FAMILY MEDICINE | Facility: CLINIC | Age: 45
End: 2025-05-19
Payer: COMMERCIAL

## 2025-05-20 ENCOUNTER — TELEPHONE (OUTPATIENT)
Dept: ORTHOPEDICS | Facility: CLINIC | Age: 45
End: 2025-05-20

## 2025-05-20 ENCOUNTER — VIRTUAL VISIT (OUTPATIENT)
Dept: ORTHOPEDICS | Facility: CLINIC | Age: 45
End: 2025-05-20
Attending: STUDENT IN AN ORGANIZED HEALTH CARE EDUCATION/TRAINING PROGRAM
Payer: COMMERCIAL

## 2025-05-20 DIAGNOSIS — G89.29 CHRONIC PAIN OF LEFT KNEE: Primary | ICD-10-CM

## 2025-05-20 DIAGNOSIS — M25.562 CHRONIC PAIN OF LEFT KNEE: ICD-10-CM

## 2025-05-20 DIAGNOSIS — G89.29 CHRONIC PAIN OF LEFT KNEE: ICD-10-CM

## 2025-05-20 DIAGNOSIS — M17.12 PRIMARY OSTEOARTHRITIS OF LEFT KNEE: Primary | ICD-10-CM

## 2025-05-20 DIAGNOSIS — M17.12 PRIMARY OSTEOARTHRITIS OF LEFT KNEE: ICD-10-CM

## 2025-05-20 DIAGNOSIS — M25.562 CHRONIC PAIN OF LEFT KNEE: Primary | ICD-10-CM

## 2025-05-20 RX ORDER — PREDNISONE 20 MG/1
40 TABLET ORAL DAILY
Qty: 10 TABLET | Refills: 0 | Status: CANCELLED | OUTPATIENT
Start: 2025-05-20

## 2025-05-20 NOTE — TELEPHONE ENCOUNTER
Other: Requesting c/b- said she missed her video appt- was crying and hard to understand what she wanted. In a lot of pain, sounds like maybe something is swollen?     Could we send this information to you in [employer] or would you prefer to receive a phone call?:   Patient would prefer a phone call   Okay to leave a detailed message?: No at Cell number on file:    Telephone Information:   Mobile [phone]

## 2025-05-20 NOTE — TELEPHONE ENCOUNTER
Patient had virtual visit.  Plan for prednisone 40 mg for 5 days, however patient was unable to tell me which pharmacy she wanted the prescription sent to while on the phone.  She is going to call back with name of pharmacy she would like prescription sent to.
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
## 2025-05-20 NOTE — TELEPHONE ENCOUNTER
Called and left a VM for patient to call back to further discuss pain.     Appears patient is scheduled for a video visit later today.

## 2025-05-20 NOTE — TELEPHONE ENCOUNTER
Medication Question or concern regarding medication   Prescription Clarification  Name of Medication: Pt said her ED note with Dr. Dennis says she was given Capsacian and Voltarin which is not correct.  Pt only has Capsacian and is still asking for something strong.  She does not want narcotics.  She is in tears and hurting with the rain.  The cortisone shot has not helped.    Prescribing Provider: Dr. Will   Pharmacy: Doesn't have a pharmacy.  Would like someone who delivers.  Is not mobile and is handicapped right now.      What on the order needs clarification? Pt is struggling emotionally.        Could we send this information to you in [employer] or would you prefer to receive a phone call?:   Patient would prefer a phone call   Okay to leave a detailed message?: webme

## 2025-05-20 NOTE — TELEPHONE ENCOUNTER
Triage Patient Outreach    Attempt # 1    Was call answered?  No.  Left voicemail to return call to Triage at Primary Clinic.     Hanna Hernadez RN
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
## 2025-05-22 RX ORDER — PREDNISONE 20 MG/1
40 TABLET ORAL DAILY
Qty: 10 TABLET | Refills: 0 | Status: SHIPPED | OUTPATIENT
Start: 2025-05-22 | End: 2025-05-27

## 2025-05-22 NOTE — TELEPHONE ENCOUNTER
Patient would like prescription sent to [facility], [address] [facility], [id number] [phone]

## 2025-05-30 ENCOUNTER — MEDICAL CORRESPONDENCE (OUTPATIENT)
Dept: HEALTH INFORMATION MANAGEMENT | Facility: CLINIC | Age: 45
End: 2025-05-30

## 2025-05-30 ENCOUNTER — HOSPITAL ENCOUNTER (EMERGENCY)
Facility: CLINIC | Age: 45
Discharge: HOME | End: 2025-05-30
Attending: EMERGENCY MEDICINE | Admitting: EMERGENCY MEDICINE
Payer: COMMERCIAL

## 2025-05-30 ENCOUNTER — TRANSFERRED RECORDS (OUTPATIENT)
Dept: HEALTH INFORMATION MANAGEMENT | Facility: CLINIC | Age: 45
End: 2025-05-30

## 2025-05-30 VITALS
SYSTOLIC BLOOD PRESSURE: 148 MMHG | RESPIRATION RATE: 18 BRPM | OXYGEN SATURATION: 97 % | DIASTOLIC BLOOD PRESSURE: 93 MMHG | HEART RATE: 79 BPM | TEMPERATURE: 98 F

## 2025-05-30 DIAGNOSIS — M17.12 PRIMARY OSTEOARTHRITIS OF LEFT KNEE: Primary | ICD-10-CM

## 2025-05-30 PROCEDURE — 99283 EMERGENCY DEPT VISIT LOW MDM: CPT

## 2025-05-30 PROCEDURE — 250N000013 HC RX MED GY IP 250 OP 250 PS 637: Performed by: EMERGENCY MEDICINE

## 2025-05-30 RX ORDER — HYDROCODONE BITARTRATE AND ACETAMINOPHEN 5; 325 MG/1; MG/1
2 TABLET ORAL ONCE
Refills: 0 | Status: COMPLETED | OUTPATIENT
Start: 2025-05-30 | End: 2025-05-30

## 2025-05-30 RX ORDER — HYDROCODONE BITARTRATE AND ACETAMINOPHEN 5; 325 MG/1; MG/1
1 TABLET ORAL EVERY 6 HOURS PRN
Qty: 10 TABLET | Refills: 0 | Status: SHIPPED | OUTPATIENT
Start: 2025-05-30 | End: 2025-06-02

## 2025-05-30 RX ADMIN — HYDROCODONE BITARTRATE AND ACETAMINOPHEN 2 TABLET: 5; 325 TABLET ORAL at 18:44

## 2025-05-30 ASSESSMENT — ACTIVITIES OF DAILY LIVING (ADL)
ADLS_ACUITY_SCORE: 41

## 2025-05-30 ASSESSMENT — COLUMBIA-SUICIDE SEVERITY RATING SCALE - C-SSRS
2. HAVE YOU ACTUALLY HAD ANY THOUGHTS OF KILLING YOURSELF IN THE PAST MONTH?: NO
6. HAVE YOU EVER DONE ANYTHING, STARTED TO DO ANYTHING, OR PREPARED TO DO ANYTHING TO END YOUR LIFE?: NO
1. IN THE PAST MONTH, HAVE YOU WISHED YOU WERE DEAD OR WISHED YOU COULD GO TO SLEEP AND NOT WAKE UP?: NO
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
## 2025-05-30 NOTE — ED TRIAGE NOTES
Pt arrives with chronic L knee pain. Cortisone shot one week ago. Seen at [facility] clinic earlier today. Pt states prescription medications do not work. Pain 10/10.

## 2025-05-30 NOTE — DISCHARGE INSTRUCTIONS

## 2025-06-02 ENCOUNTER — PATIENT OUTREACH (OUTPATIENT)
Dept: CARE COORDINATION | Facility: CLINIC | Age: 45
End: 2025-06-02

## 2025-06-02 ENCOUNTER — TELEPHONE (OUTPATIENT)
Dept: PALLIATIVE MEDICINE | Facility: CLINIC | Age: 45
End: 2025-06-02

## 2025-06-02 ENCOUNTER — TELEPHONE (OUTPATIENT)
Dept: ORTHOPEDICS | Facility: CLINIC | Age: 45
End: 2025-06-02

## 2025-06-02 ENCOUNTER — PRE VISIT (OUTPATIENT)
Dept: ORTHOPEDICS | Facility: CLINIC | Age: 45
End: 2025-06-02

## 2025-06-02 ENCOUNTER — TRANSCRIBE ORDERS (OUTPATIENT)
Dept: OTHER | Age: 45
End: 2025-06-02

## 2025-06-02 DIAGNOSIS — F25.0 SCHIZOAFFECTIVE DISORDER, BIPOLAR TYPE (H): ICD-10-CM

## 2025-06-02 DIAGNOSIS — G89.29 CHRONIC PAIN OF LEFT KNEE: Primary | ICD-10-CM

## 2025-06-02 DIAGNOSIS — M25.562 CHRONIC PAIN OF LEFT KNEE: Primary | ICD-10-CM

## 2025-06-02 DIAGNOSIS — M32.9 SYSTEMIC LUPUS ERYTHEMATOSUS, UNSPECIFIED SLE TYPE, UNSPECIFIED ORGAN INVOLVEMENT STATUS (H): Primary | ICD-10-CM

## 2025-06-02 NOTE — TELEPHONE ENCOUNTER
I called and spoke with the referral dept. She stated that she did send this to me for pain management. I told her we are not able to take 3rd party pain referral. It was not for an injection

## 2025-06-02 NOTE — TELEPHONE ENCOUNTER
DIAGNOSIS: L knee pain, self referred - pt in ED for knee pain 5/30, xray 5/16/25, [facility]     APPOINTMENT DATE: 6/2/25   NOTES STATUS DETAILS   OFFICE NOTE from other specialist CE/Internal 5/30/25  Latisha  [facility]    5/20/25, 5/6/25  Nicolette  [facility]   DISCHARGE REPORT from the ER Internal 5/30/25  White  [facility]    5/30/25  Strain  [facility]    5/16/25  Cousins  [facility]    More related encounters in Epic/CE   MEDICATION LIST Internal    XRAYS (IMAGES & REPORTS) Internal 5/16/25, 5/3/25, 11/24/24  XR Knee Left    5/6/25  XR Knee Donald

## 2025-06-02 NOTE — TELEPHONE ENCOUNTER
Please call patient:  Seen in ER 5/30 for knee pain.    Can consider a pain referral but need more information-  What is she taking for pain right now?   Is she using crutches or a walker or wheelchair?    We can place a referral for social work if she's agreeable.  Please ask patient.    Finally, as she missed ortho appointment, [facility] walk in would be an option.      Kendell Tate MD on 6/2/2025 at 5:03 PM

## 2025-06-02 NOTE — TELEPHONE ENCOUNTER
New Medication Request    Contacts       Contact Date/Time Type Contact Phone/Fax    06/02/2025 10:00 AM CDT Phone (Incoming) Schoen, Corinna J (Self) [phone] (M)            What medication are you requesting?: hydrocodone 5 mg    Reason for medication request: pain  Have you taken this medication before?: Yes:  - per patient was prescribed in the ER, had an appt today 6/02/25 but canceled it due to lack of transportation, requesting if Dr. Holt can help refill this medication?     Controlled Substance Agreement on file:   CSA -- Patient Level:    CSA: None found at the patient level.         Patient offered an appointment? Yes: patient was scheduled but canceled due to lack of transportation. Offer to reschedule appt but patient decline rescheduling     Preferred Pharmacy:      [facility] [place] [place] - [address] [address]  Phone: [phone] Fax: [phone]          Could we send this information to you in [employer] or would you prefer to receive a phone call?:   Patient would prefer a phone call   Okay to leave a detailed message?: Yes at Cell number on file:    Telephone Information:   Mobile [phone]

## 2025-06-03 ENCOUNTER — PATIENT OUTREACH (OUTPATIENT)
Dept: CARE COORDINATION | Facility: CLINIC | Age: 45
End: 2025-06-03
Payer: COMMERCIAL

## 2025-06-03 ENCOUNTER — TRANSCRIBE ORDERS (OUTPATIENT)
Dept: OTHER | Age: 45
End: 2025-06-03

## 2025-06-03 DIAGNOSIS — Z12.11 COLON CANCER SCREENING: Primary | ICD-10-CM

## 2025-06-03 DIAGNOSIS — Z00.00 ANNUAL PHYSICAL EXAM: ICD-10-CM

## 2025-06-03 NOTE — TELEPHONE ENCOUNTER
To Dr. Tate,     Called pt and discussed provider message below. She is has been using norco from the hospital for pain management but does not have any remaining. Pt has been doing knee exercises. Pt is using crutches. Advised pt she could go to [facility] walk in, pt unsure at ths point if she is going to [facility] or not. Pt is agreeable to pain clinic and social work referrals.      Hanna Hernadez RN

## 2025-06-04 ENCOUNTER — HOSPITAL ENCOUNTER (EMERGENCY)
Facility: CLINIC | Age: 45
Discharge: HOME | End: 2025-06-04
Attending: EMERGENCY MEDICINE | Admitting: EMERGENCY MEDICINE
Payer: COMMERCIAL

## 2025-06-04 ENCOUNTER — PATIENT OUTREACH (OUTPATIENT)
Dept: CARE COORDINATION | Facility: CLINIC | Age: 45
End: 2025-06-04
Payer: COMMERCIAL

## 2025-06-04 VITALS
TEMPERATURE: 97.9 F | HEART RATE: 71 BPM | SYSTOLIC BLOOD PRESSURE: 121 MMHG | DIASTOLIC BLOOD PRESSURE: 76 MMHG | RESPIRATION RATE: 20 BRPM | OXYGEN SATURATION: 96 %

## 2025-06-04 DIAGNOSIS — M25.562 CHRONIC PAIN OF LEFT KNEE: ICD-10-CM

## 2025-06-04 DIAGNOSIS — G89.29 CHRONIC PAIN OF LEFT KNEE: ICD-10-CM

## 2025-06-04 DIAGNOSIS — F15.10 METHAMPHETAMINE ABUSE (H): ICD-10-CM

## 2025-06-04 DIAGNOSIS — M25.462 EFFUSION OF LEFT KNEE: ICD-10-CM

## 2025-06-04 DIAGNOSIS — S62.625A CLOSED DISPLACED FRACTURE OF MIDDLE PHALANX OF LEFT RING FINGER, INITIAL ENCOUNTER: ICD-10-CM

## 2025-06-04 DIAGNOSIS — R45.851 SUICIDAL IDEATION: ICD-10-CM

## 2025-06-04 DIAGNOSIS — F10.90 ALCOHOL USE DISORDER: ICD-10-CM

## 2025-06-04 LAB
ALBUMIN SERPL BCG-MCNC: 3.5 G/DL (ref 3.5–5.2)
ALP SERPL-CCNC: 96 U/L (ref 40–150)
ALT SERPL W P-5'-P-CCNC: 16 U/L (ref 0–50)
ANION GAP SERPL CALCULATED.3IONS-SCNC: 12 MMOL/L (ref 7–15)
AST SERPL W P-5'-P-CCNC: 23 U/L (ref 0–45)
BASOPHILS # BLD AUTO: 0 10E3/UL (ref 0–0.2)
BASOPHILS NFR BLD AUTO: 0 %
BILIRUB SERPL-MCNC: 0.2 MG/DL
BUN SERPL-MCNC: 12 MG/DL (ref 6–20)
CALCIUM SERPL-MCNC: 8.7 MG/DL (ref 8.8–10.4)
CHLORIDE SERPL-SCNC: 104 MMOL/L (ref 98–107)
CREAT SERPL-MCNC: 0.57 MG/DL (ref 0.51–0.95)
EGFRCR SERPLBLD CKD-EPI 2021: >90 ML/MIN/1.73M2
EOSINOPHIL # BLD AUTO: 0.3 10E3/UL (ref 0–0.7)
EOSINOPHIL NFR BLD AUTO: 6 %
ERYTHROCYTE [DISTWIDTH] IN BLOOD BY AUTOMATED COUNT: 13.2 % (ref 10–15)
ETHANOL SERPL-MCNC: <0.01 G/DL
GIANT PLATELETS BLD QL SMEAR: SLIGHT
GLUCOSE SERPL-MCNC: 106 MG/DL (ref 70–99)
HCO3 SERPL-SCNC: 23 MMOL/L (ref 22–29)
HCT VFR BLD AUTO: 42.3 % (ref 35–47)
HGB BLD-MCNC: 14.2 G/DL (ref 11.7–15.7)
IMM GRANULOCYTES # BLD: 0 10E3/UL
IMM GRANULOCYTES NFR BLD: 0 %
LYMPHOCYTES # BLD AUTO: 1.1 10E3/UL (ref 0.8–5.3)
LYMPHOCYTES NFR BLD AUTO: 21 %
MAGNESIUM SERPL-MCNC: 2 MG/DL (ref 1.7–2.3)
MCH RBC QN AUTO: 30.2 PG (ref 26.5–33)
MCHC RBC AUTO-ENTMCNC: 33.6 G/DL (ref 31.5–36.5)
MCV RBC AUTO: 90 FL (ref 78–100)
MONOCYTES # BLD AUTO: 0.4 10E3/UL (ref 0–1.3)
MONOCYTES NFR BLD AUTO: 8 %
NEUTROPHILS # BLD AUTO: 3.4 10E3/UL (ref 1.6–8.3)
NEUTROPHILS NFR BLD AUTO: 65 %
NRBC # BLD AUTO: 0 10E3/UL
NRBC BLD AUTO-RTO: 0 /100
PLAT MORPH BLD: ABNORMAL
PLATELET # BLD AUTO: 347 10E3/UL (ref 150–450)
POTASSIUM SERPL-SCNC: 3.6 MMOL/L (ref 3.4–5.3)
PROT SERPL-MCNC: 6.8 G/DL (ref 6.4–8.3)
RADIOLOGIST FLAGS: ABNORMAL
RBC # BLD AUTO: 4.7 10E6/UL (ref 3.8–5.2)
RBC MORPH BLD: ABNORMAL
SODIUM SERPL-SCNC: 139 MMOL/L (ref 135–145)
WBC # BLD AUTO: 5.3 10E3/UL (ref 4–11)

## 2025-06-04 PROCEDURE — 250N000013 HC RX MED GY IP 250 OP 250 PS 637: Performed by: EMERGENCY MEDICINE

## 2025-06-04 PROCEDURE — 96375 TX/PRO/DX INJ NEW DRUG ADDON: CPT

## 2025-06-04 PROCEDURE — 82077 ASSAY SPEC XCP UR&BREATH IA: CPT | Performed by: EMERGENCY MEDICINE

## 2025-06-04 PROCEDURE — 96374 THER/PROPH/DIAG INJ IV PUSH: CPT

## 2025-06-04 PROCEDURE — 82565 ASSAY OF CREATININE: CPT | Performed by: EMERGENCY MEDICINE

## 2025-06-04 PROCEDURE — 83735 ASSAY OF MAGNESIUM: CPT | Performed by: EMERGENCY MEDICINE

## 2025-06-04 PROCEDURE — 85041 AUTOMATED RBC COUNT: CPT | Performed by: EMERGENCY MEDICINE

## 2025-06-04 PROCEDURE — 250N000011 HC RX IP 250 OP 636: Performed by: EMERGENCY MEDICINE

## 2025-06-04 PROCEDURE — 258N000003 HC RX IP 258 OP 636: Performed by: EMERGENCY MEDICINE

## 2025-06-04 PROCEDURE — 36415 COLL VENOUS BLD VENIPUNCTURE: CPT | Performed by: EMERGENCY MEDICINE

## 2025-06-04 PROCEDURE — 99285 EMERGENCY DEPT VISIT HI MDM: CPT | Mod: 25

## 2025-06-04 PROCEDURE — 96361 HYDRATE IV INFUSION ADD-ON: CPT

## 2025-06-04 PROCEDURE — 26720 TREAT FINGER FRACTURE EACH: CPT

## 2025-06-04 RX ORDER — HYDROCODONE BITARTRATE AND ACETAMINOPHEN 5; 325 MG/1; MG/1
2 TABLET ORAL ONCE
Refills: 0 | Status: COMPLETED | OUTPATIENT
Start: 2025-06-04 | End: 2025-06-04

## 2025-06-04 RX ORDER — HYDROCODONE BITARTRATE AND ACETAMINOPHEN 5; 325 MG/1; MG/1
1 TABLET ORAL EVERY 6 HOURS PRN
Qty: 10 TABLET | Refills: 0 | Status: SHIPPED | OUTPATIENT
Start: 2025-06-04

## 2025-06-04 RX ORDER — NAPROXEN 500 MG/1
500 TABLET ORAL 2 TIMES DAILY WITH MEALS
Qty: 20 TABLET | Refills: 0 | Status: SHIPPED | OUTPATIENT
Start: 2025-06-04

## 2025-06-04 RX ORDER — HYDROCODONE BITARTRATE AND ACETAMINOPHEN 5; 325 MG/1; MG/1
1 TABLET ORAL EVERY 6 HOURS PRN
Refills: 0 | Status: DISCONTINUED | OUTPATIENT
Start: 2025-06-05 | End: 2025-06-05 | Stop reason: HOSPADM

## 2025-06-04 RX ORDER — MULTIPLE VITAMINS W/ MINERALS TAB 9MG-400MCG
1 TAB ORAL ONCE
Status: COMPLETED | OUTPATIENT
Start: 2025-06-04 | End: 2025-06-04

## 2025-06-04 RX ORDER — FOLIC ACID 5 MG/ML
1 INJECTION, SOLUTION INTRAMUSCULAR; INTRAVENOUS; SUBCUTANEOUS ONCE
Status: COMPLETED | OUTPATIENT
Start: 2025-06-04 | End: 2025-06-04

## 2025-06-04 RX ORDER — THIAMINE HYDROCHLORIDE 100 MG/ML
100 INJECTION, SOLUTION INTRAMUSCULAR; INTRAVENOUS ONCE
Status: COMPLETED | OUTPATIENT
Start: 2025-06-04 | End: 2025-06-04

## 2025-06-04 RX ORDER — KETOROLAC TROMETHAMINE 15 MG/ML
15 INJECTION, SOLUTION INTRAMUSCULAR; INTRAVENOUS ONCE
Status: DISCONTINUED | OUTPATIENT
Start: 2025-06-04 | End: 2025-06-05 | Stop reason: HOSPADM

## 2025-06-04 RX ADMIN — Medication 1 TABLET: at 19:44

## 2025-06-04 RX ADMIN — HYDROCODONE BITARTRATE AND ACETAMINOPHEN 2 TABLET: 5; 325 TABLET ORAL at 19:43

## 2025-06-04 RX ADMIN — SODIUM CHLORIDE 1000 ML: 0.9 INJECTION, SOLUTION INTRAVENOUS at 19:38

## 2025-06-04 RX ADMIN — SODIUM CHLORIDE 1000 ML: 9 INJECTION, SOLUTION INTRAVENOUS at 21:23

## 2025-06-04 RX ADMIN — THIAMINE HYDROCHLORIDE 100 MG: 100 INJECTION, SOLUTION INTRAMUSCULAR; INTRAVENOUS at 19:44

## 2025-06-04 RX ADMIN — FOLIC ACID 1 MG: 5 INJECTION, SOLUTION INTRAMUSCULAR; INTRAVENOUS; SUBCUTANEOUS at 20:31

## 2025-06-04 ASSESSMENT — ACTIVITIES OF DAILY LIVING (ADL)
ADLS_ACUITY_SCORE: 41
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
## 2025-06-04 NOTE — PROGRESS NOTES
Clinic Care Coordination Contact  [facility]/Voicemail    Clinical Data: Care Coordinator Outreach    Outreach Documentation Number of Outreach Attempt   6/4/2025  11:13 AM 1       Left message on patient's voicemail with call back information and requested return call.      Plan: Care Coordinator will try to reach patient again in 1-2 business days.    MARIA DEL ROSARIO Mckeon   [phone]  Clinic Care Coordination  [facility]

## 2025-06-04 NOTE — LETTER
[facility] CARE COORDINATION  [address]  [facility] [facility] [address]    June 5, 2025    Corinna J Schoen  [address]  [place] [place] [facility] [address]      Dear Sonja,    I am a clinic community health worker who works with Kendell Tate MD with the [facility]. I have been trying to reach you recently to introduce Clinic Care Coordination. Below is a description of clinic care coordination and how I can further assist you.       The clinic care coordination team is made up of a registered nurse, , financial resource worker and community health worker who understand the health care system. The goal of clinic care coordination is to help you manage your health and improve access to the health care system. Our team works alongside your provider to assist you in determining your health and social needs. We can help you obtain health care and community resources, providing you with necessary information and education. We can work with you through any barriers and develop a care plan that helps coordinate and strengthen the communication between you and your care team.  Our services are voluntary and are offered without charge to you personally.    Please feel free to contact me with any questions or concerns regarding care coordination and what we can offer.      We are focused on providing you with the highest-quality healthcare experience possible.    Sincerely,     Ileana Rojas, MARIA DEL ROSARIO  Clinic Care Coordination  [facility]: [place], [place], [place], and [facility]  Phone: [phone]
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
## 2025-06-05 ENCOUNTER — HOSPITAL ENCOUNTER (OUTPATIENT)
Facility: AMBULATORY SURGERY CENTER | Age: 45
End: 2025-06-05
Attending: INTERNAL MEDICINE
Payer: COMMERCIAL

## 2025-06-05 ENCOUNTER — PATIENT OUTREACH (OUTPATIENT)
Dept: CARE COORDINATION | Facility: CLINIC | Age: 45
End: 2025-06-05
Payer: COMMERCIAL

## 2025-06-05 ENCOUNTER — TELEPHONE (OUTPATIENT)
Dept: GASTROENTEROLOGY | Facility: CLINIC | Age: 45
End: 2025-06-05
Payer: COMMERCIAL

## 2025-06-05 NOTE — DISCHARGE INSTRUCTIONS
Wear splint on your finger until you see the Orthopedic Surgeon in clinic.    Keep your knee elevated as much as possible.    Opioid Medication Information    You have been given a prescription for an opioid (narcotic) pain medicine and/or have received a pain medicine while here in the Emergency Department. These medicines can make you drowsy or impaired. You must not drive, operate dangerous equipment, or engage in any other dangerous activities while taking these medications. If you drive while taking these medications, you could be arrested for DUI, or driving under the influence. Do not drink any alcohol while you are taking these medications.   Opioid pain medications can cause addiction. If you have a history of chemical dependency of any type, you are at a higher risk of becoming addicted to pain medications.  Only take these prescribed medications to treat your pain when all other options have been tried. Take it for as short a time and as few doses as possible. Store your pain pills in a secure place, as they are frequently stolen and provide a dangerous opportunity for children or visitors in your house to start abusing these powerful medications. We will not replace any lost or stolen medicine.  As soon as your pain is better, you should flush all your remaining medication.   Many prescription pain medications contain Tylenol  (acetaminophen), including Vicodin , Tylenol #3 , Norco , Lortab , and Percocet .  You should not take any extra pills of Tylenol  if you are using these prescription medications or you can get very sick.  Do not ever take more than 4000 mg of acetaminophen in any 24 hour period.  All opioids tend to cause constipation. Drink plenty of water and eat foods that have a lot of fiber, such as fruits, vegetables, prune juice, apple juice and high fiber cereal.  Take a laxative if you don t move your bowels at least every other day. Miralax , Milk of Magnesia, Colace , or Senna  can be  used to keep you regular.

## 2025-06-05 NOTE — TELEPHONE ENCOUNTER
"Endoscopy Scheduling Screen    Caller: Elizabeth from [facility]    Have you had any respiratory illness or flu-like symptoms in the last 10 days?  No    What is your communication preference for Instructions and/or Bowel Prep?   MyChart    What insurance is in the chart?  Other:  [facility]    Ordering/Referring Provider: DAX ELLISON    (If ordering provider performs procedure, schedule with ordering provider unless otherwise instructed. )    BMI: Estimated body mass index is 43.84 kg/m  as calculated from the following:    Height as of 5/30/25: 1.676 m (5' 6\").    Weight as of 5/30/25: 123.2 kg (271 lb 9.6 oz).     Sedation Ordered  moderate sedation.   If patient BMI > 50 do not schedule in [facility].    If patient BMI > 45 do not schedule at [facility].    Are you taking methadone or Suboxone?  NO, No RN review required.    Have you been diagnosed and are being treated for severe PTSD or severe anxiety?  NO, No RN review required.    Are you taking any prescription medications for pain 3 or more times per week?   NO, No RN review required.    Do you have a history of malignant hyperthermia?  No    (Females) Are you currently pregnant?   No     Have you been diagnosed or told you have pulmonary hypertension?   No    Do you have an LVAD?  No    Have you been told you have moderate to severe sleep apnea?  No.    Have you been told you have COPD, asthma, or any other lung disease?  No    Has your doctor ordered any cardiac tests like echo, angiogram, stress test, ablation, or EKG, that you have not completed yet?  No    Do you  have a history of any heart conditions?  No     Have you ever had or are you waiting for an organ transplant?  No. Continue scheduling, no site restrictions.    Have you had a stroke or transient ischemic attack (TIA aka \"mini stroke\") in the last 2 years?   No.    Have you been diagnosed with or been told you have cirrhosis of the liver?   No.    Are you currently on " "dialysis?   No    Do you need assistance transferring?   No    BMI: Estimated body mass index is 43.84 kg/m  as calculated from the following:    Height as of 5/30/25: 1.676 m (5' 6\").    Weight as of 5/30/25: 123.2 kg (271 lb 9.6 oz).     Is patients BMI > 40 and scheduling location UP?  No    Do you take an injectable or oral medication for weight loss or diabetes (excluding insulin)?  No    Do you take the medication Naltrexone?  No    Do you take blood thinners?  No       Prep   Are you currently on dialysis or do you have chronic kidney disease?  No    Do you have a diagnosis of diabetes?  Yes ([place]ytely Prep)    Do you have a diagnosis of cystic fibrosis (CF)?  No    On a regular basis do you go 3 -5 days between bowel movements?  No    BMI > 40?  Yes (Extended Prep)    Preferred Pharmacy:          [facility] [place] [place] - [address] [address]  Phone: [phone] Fax: [phone]          Final Scheduling Details     Procedure scheduled  Colonoscopy    Surgeon:       Date of procedure:  7/31/25     Pre-OP / PAC:   No - Not required for this site.    Location  [facility] - [facility] - Patient preference.    Sedation   Moderate Sedation - Per order.      Patient Reminders:   You will receive a call from a Nurse to review instructions and health history.  This assessment must be completed prior to your procedure.  Failure to complete the Nurse assessment may result in the procedure being cancelled.      On the day of your procedure, please designate an adult(s) who can drive you home stay with you for the next 24 hours. The medicines used in the exam will make you sleepy. You will not be able to drive.      You cannot take public transportation, ride share services, or non-medical taxi service without a responsible caregiver.  Medical transport services are allowed with the requirement that a responsible caregiver will receive you at your destination.  We " require that drivers and caregivers are confirmed prior to your procedure.

## 2025-06-05 NOTE — PROGRESS NOTES
Clinic Care Coordination Contact  [facility]/Voicemail    Clinical Data: Care Coordinator Outreach    Outreach Documentation Number of Outreach Attempt   6/4/2025  11:13 AM 1   6/5/2025  12:16 PM 2       Left message on patient's voicemail with call back information and requested return call.      Plan: Care Coordinator will send care coordination introduction letter with care coordinator contact information and explanation of care coordination services via [employer]. Care Coordinator will do no further outreaches at this time.    MARIA DEL ROSARIO Herbert  Clinic Care Coordination  [facility]: [place] [place], [place], [place], and [facility]  Phone: [phone]

## 2025-06-05 NOTE — ED NOTES
[facility]  ED to EMPATH Checklist:      Goal for EMPATH: Suicidality    Current Behavior: Cooperative    Safety Concerns: Suicidal, no plan    Legal Hold Status: Voluntary    Medically Cleared by ED provider: Yes    Patient Therapeutically Searched: Therapeutic search by ED staff (strings, belts, shoes, pockets, electronics, etc.)    Belongings: Remain with patient    Independent Ambulation at Baseline: Yes/No: Yes    Participates in Care/Conversation: Yes/No: Yes    Patient Informed about EMPATH: Yes/No: Yes    [facility]: Ordered and pending    Patient Ready to be Transferred to EMPATH? Yes/No: Yes

## 2025-06-09 ENCOUNTER — PATIENT OUTREACH (OUTPATIENT)
Dept: CARE COORDINATION | Facility: CLINIC | Age: 45
End: 2025-06-09
Payer: COMMERCIAL

## 2025-06-11 ENCOUNTER — TELEPHONE (OUTPATIENT)
Dept: ORTHOPEDICS | Facility: CLINIC | Age: 45
End: 2025-06-11
Payer: COMMERCIAL

## 2025-06-11 ENCOUNTER — TELEPHONE (OUTPATIENT)
Dept: FAMILY MEDICINE | Facility: CLINIC | Age: 45
End: 2025-06-11
Payer: COMMERCIAL

## 2025-06-11 NOTE — TELEPHONE ENCOUNTER
Order/Referral Request    Who is requesting: PT     Orders being requested: Request for adult diapers depends, size XL pull up. As many as you can order.    Please notify  [facility] [phone], of another way to order these. Can they be phoned in to pharmacy?     Reason service is needed/diagnosis:      When are orders needed by:  asap    Has this been discussed with Provider: No    Does patient have a preference on a Group/Provider/Facility? Please send via fax to [facility] phone [phone], fax [phone].    Does patient have an appointment scheduled?: No    Where to send orders: above     Could we send this information to you in [employer] or would you prefer to receive a phone call?:   No preference   Okay to leave a detailed message?: Yes at Other phone number:  [facility] [phone] 0k'd per patient.

## 2025-06-11 NOTE — TELEPHONE ENCOUNTER
Medication Refill Request    Has the patient contacted the pharmacy for the refill? Yes   Name of medication being requested: naproxen (NAPROSYN) 500 MG tablet   HYDROcodone-acetaminophen (NORCO) 5-325 MG     Could we send this information to you in [employer] or would you prefer to receive a phone call?:   Patient would prefer a phone call   Okay to leave a detailed message?: Yes at Cell number on file:    Telephone Information:   Mobile [phone]

## 2025-06-12 ENCOUNTER — VIRTUAL VISIT (OUTPATIENT)
Dept: FAMILY MEDICINE | Facility: CLINIC | Age: 45
End: 2025-06-12
Payer: COMMERCIAL

## 2025-06-12 ENCOUNTER — TELEPHONE (OUTPATIENT)
Dept: FAMILY MEDICINE | Facility: CLINIC | Age: 45
End: 2025-06-12

## 2025-06-12 DIAGNOSIS — R39.81 FUNCTIONAL URINARY INCONTINENCE: ICD-10-CM

## 2025-06-12 DIAGNOSIS — N95.8 GENITOURINARY SYNDROME OF MENOPAUSE: Primary | ICD-10-CM

## 2025-06-12 RX ORDER — ESTRADIOL 0.1 MG/G
2 CREAM VAGINAL
Qty: 42.5 G | Refills: 4 | Status: SHIPPED | OUTPATIENT
Start: 2025-06-12 | End: 2025-06-12

## 2025-06-12 NOTE — TELEPHONE ENCOUNTER
[facility] and they do not carry estradiol on their formulary.    They do have premarin and wonder if that would be appropriate to use?  Otherwise we'd need to send to a different pharmacy for estradiol.    Pharm pended. Please advise.     Juliana Castro, RN  [facility] RN Triage Team

## 2025-06-12 NOTE — TELEPHONE ENCOUNTER
ALYSON Mackay () is requesting adult diapers for increased incontinent episodes  Writer spoke with Valentine, , to collect additional information. Writer was informed that for the last week patient has had increased urinary urgency & frequency and has had more incontinent episodes (thus why there is a request for incontinent supply). Writer suggested that patient schedule a VV to make this request and to be evaluated for potential UTI. Valentine agreed to recommendation.

## 2025-06-12 NOTE — TELEPHONE ENCOUNTER
Refill not appropriate. I have not ordered this medication for this patient and it is being used from chronic pain. Recommend patient follow up with primary or pain management.

## 2025-06-12 NOTE — PROGRESS NOTES
Sonja is a 45 year old who is being evaluated via a billable video visit.    How would you like to obtain your AVS? MyChart  If the video visit is dropped, the invitation should be resent by: Text to cell phone: [phone]  Will anyone else be joining your video visit? No      Assessment & Plan     Genitourinary syndrome of menopause  Suspect this is playing a role in her increased incontinence. Will start vaginal estrogen    - estradiol (ESTRACE) 0.1 MG/GM vaginal cream; Place 2 g vaginally twice a week.    Functional urinary incontinence  Ordered incontinence supplies.   - Incontinence Supplies Order        Subjective   Sonja is a 45 year old, presenting for the following health issues:  Urinary Problem      Video Start Time: 10:51 AM    History of Present Illness       Reason for visit:  Urinary problem   She is taking medications regularly.        Has urinary incontinence. Using depends, sometimes up to 8-10 per day.   Completed antibiotics so doesn't think she has a UTI  Hasn't had a period in awhile       Review of Systems  Detailed as above         Objective           Vitals:  No vitals were obtained today due to virtual visit.    Physical Exam   GENERAL: alert and no distress  EYES: Eyes grossly normal to inspection.  No discharge or erythema, or obvious scleral/conjunctival abnormalities.  RESP: No audible wheeze, cough, or visible cyanosis.    SKIN: Visible skin clear. No significant rash, abnormal pigmentation or lesions.  NEURO: Cranial nerves grossly intact.  Mentation and speech appropriate for age.  PSYCH: Appropriate affect, tone, and pace of words          Video-Visit Details    Type of service:  Video Visit   Video End Time:10:56 AM  call cut out. I tried to call pt but it went right to voicemail. We were at the end of our visit so did not need to talk with her again.   Originating Location (pt. Location): Home    Distant Location (provider location):  On-site  Platform used for Video Visit:  Doximity  Signed Electronically by: DINH Bridges CNP    DME (Durable Medical Equipment) Orders and Documentation  Orders Placed This Encounter   Procedures    Incontinence Supplies Order      The patient was assessed and it was determined the patient is in need of the following listed DME Supplies/Equipment. Please complete supporting documentation below to demonstrate medical necessity.      Incontinence Supplies Documentation  Incontinence supplies are needed due to urinary incontinence.       10 minutes spent on the date of the encounter doing chart review, history and exam, documentation and further activities as noted above

## 2025-06-12 NOTE — TELEPHONE ENCOUNTER
ATC forwarded request to primary care due to Dr. Will not prescribing this medication for the patient in the past.    Eli Ponce MS, LAT, ATC  Certified Athletic Trainer

## 2025-06-12 NOTE — PATIENT INSTRUCTIONS
Use the vaginal estrogen cream twice a week at night   You can just apply it with your finger externally and a little bit inside as well   This will help with the urinary incontinence     You can go to a medical supply store to get the depends

## 2025-06-24 ENCOUNTER — MYC MEDICAL ADVICE (OUTPATIENT)
Dept: ORTHOPEDICS | Facility: CLINIC | Age: 45
End: 2025-06-24

## 2025-06-24 NOTE — TELEPHONE ENCOUNTER
MILY and Rory sent to patient regarding canceled 6/24/25 appointment due to provider unavailable. Patient can reschedule to next available with Dr. Will.

## 2025-06-27 ENCOUNTER — HOSPITAL ENCOUNTER (EMERGENCY)
Facility: CLINIC | Age: 45
Discharge: HOME | End: 2025-06-27
Attending: EMERGENCY MEDICINE | Admitting: EMERGENCY MEDICINE
Payer: COMMERCIAL

## 2025-06-27 VITALS
HEART RATE: 81 BPM | DIASTOLIC BLOOD PRESSURE: 67 MMHG | TEMPERATURE: 97.7 F | SYSTOLIC BLOOD PRESSURE: 92 MMHG | OXYGEN SATURATION: 98 % | RESPIRATION RATE: 18 BRPM

## 2025-06-27 DIAGNOSIS — F15.10 METHAMPHETAMINE ABUSE (H): ICD-10-CM

## 2025-06-27 DIAGNOSIS — F25.0 SCHIZOAFFECTIVE DISORDER, BIPOLAR TYPE (H): ICD-10-CM

## 2025-06-27 DIAGNOSIS — R44.3 HALLUCINATIONS: ICD-10-CM

## 2025-06-27 DIAGNOSIS — F19.99 SUBSTANCE-RELATED DISORDER (H): Primary | ICD-10-CM

## 2025-06-27 PROCEDURE — 99283 EMERGENCY DEPT VISIT LOW MDM: CPT | Performed by: EMERGENCY MEDICINE

## 2025-06-27 ASSESSMENT — ACTIVITIES OF DAILY LIVING (ADL)
ADLS_ACUITY_SCORE: 41

## 2025-06-27 ASSESSMENT — COLUMBIA-SUICIDE SEVERITY RATING SCALE - C-SSRS
1. IN THE PAST MONTH, HAVE YOU WISHED YOU WERE DEAD OR WISHED YOU COULD GO TO SLEEP AND NOT WAKE UP?: NO
2. HAVE YOU ACTUALLY HAD ANY THOUGHTS OF KILLING YOURSELF IN THE PAST MONTH?: NO
6. HAVE YOU EVER DONE ANYTHING, STARTED TO DO ANYTHING, OR PREPARED TO DO ANYTHING TO END YOUR LIFE?: YES
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
## 2025-06-27 NOTE — ED TRIAGE NOTES
"Reports both AH and VH- does not see or hear anything specific just knows \"its not real\". Hx schizoaffective- not on medication for MH. IV meth use, last used 2 days ago     Triage Assessment (Adult)       Row Name 06/27/25 4871          Triage Assessment    Airway WDL WDL        Respiratory WDL    Respiratory WDL WDL        Skin Circulation/Temperature WDL    Skin Circulation/Temperature WDL WDL        Cardiac WDL    Cardiac WDL WDL        Peripheral/Neurovascular WDL    Peripheral Neurovascular WDL WDL        Cognitive/Neuro/Behavioral WDL    Cognitive/Neuro/Behavioral WDL WDL                     "

## 2025-06-28 NOTE — DISCHARGE INSTRUCTIONS
Upcoming Appointments    Type: Telepsychiatry  Date: Tuesday, 7/1/2025  Time: 10:00 am - 11:00 am  Provider: Rosa Maria MARTINEZ  CNP,PMKHLOEP,RN  Location: [place] [address]  Phone: [phone]    Patient Navigation Hub - Scheduled Appointment    You have been scheduled a telephone appointment with the Mental Health and Addiction Services Patient Navigation Hub. As a reminder, this is not an in-person appointment. A Navigator will contact you at your personal telephone number on 07/01at 9am. You can expect a 15-30 minute appointment. You will discuss programming options and be assisted in next steps. If you have any further questions or concerns, please contact the Patient Navigation Hub at [phone]. Note: You will not be charged for this telephone appointment.    Our Navigators work to be your point-of-contact for trustworthy and compassionate care from Emergency Services [facility]. We will provide resources and communication to help guide you into programmatic care, other internal resources (I.e., Transition Clinic), and/or community programs. Ultimately, our goal is to be the one-stop-shop of communication, coordination, and support for you.    Phone: [phone]  Email: [email or web address]  Fax: [phone]    [facility]  The following is a list of our programming options that may fit your next steps:    Programs  Mental Health  Intensive Outpatient Program (IOP)  Partial Hospitalization Program (PHP)  Day Treatment  Substance Use Disorder  Intensive Outpatient Program  Outpatient Group  Mental Health & Substance Use Disorder  Co-Occurring Intensive Outpatient Program  Residential  Available Locations  [facility], [facility], [place], [place], [place]  Note: Specific program options vary by location.    Transition Clinic  After leaving Emergency Services, it  may take up to 30 days to enter a program. Knowing support is important during this period of time, we offer an urgent model of mental health care via the Transition Clinic. We encourage you to discuss this with your Navigator during your telephone appointment.    FAQ  What can I expect after leaving Emergency Services?  If not already, you will soon be contacted by a Navigator who will work with you to find a program that fits your needs. If you desire to enter one of our [facility] programs, you will enter our programmatic care intake where an assessment will likely be needed over telephone, virtually, or in-person. After this assessment, a Navigator will connect with you again to provide a handoff to the specific program for next steps.   Please note that it may take up to 30 days for you to begin a program. If an extended wait occurs, please consider services at the Transition Clinic.  What is programmatic care?  It is a highly structured and comprehensive approach designed to address mental health and substance use disorders.   Programmatic care is typically used when individuals have not responded well to less intensive treatments or when they require a higher level of intervention due to the severity of their condition. It can be found in various settings, such as an outpatient location, residential treatment facilities, or inpatient hospitals.  Each program varies in duration and weekly commitments. Ask a Navigator for more program details.

## 2025-06-28 NOTE — PLAN OF CARE
Corinna J Schoen  June 28, 2025  Plan of Care Hand-off Note     Patient Recommended Care Path: discharge    Clinical Substantiation:  It is the recommendation of this provider that pt discharge. Pt presented following recent methamphetamine use, while endorsing occasional AVH and passive/fleeting SI. Pt appears capable of reality testing AVH, which may be present due to recent methamphetamine use or lack of medications for previous dx of Schizoaffective d/o - bipolar type. Pt denies any current safety concerns and does not appear to be experiencing a severe impairment of functioning for which hospitalization would be required. Pt preferred to discharge [facility] referrals for OP med mgmt and programmatic care (co-occurring MICD tx); pt's [facility] will assist pt in attending appointments scheduled. Pt is seeking more supportive arrangements than current housing; pt and advocate filled out a referral form for [facility] IRTS which offer harm reduction options in the event pt cannot obtain MICD residential or IOP with lodging after further assessment (writer will submit the form electronically on pt's behalf as permitted by written RENALDO).    Goals for crisis stabilization:  Assess for risk    Next steps for Care Team:  Referrals for OP med mgmt, programmatic care, and IRTS.    Treatment Objectives Addressed:  rapport building, orienting the patient to therapy, assessing safety, safety planning, processing feelings, exploring obstacles to safety in the community, identifying additional supports, identifying an appropriate aftercare plan, identifying treatment goals    Therapeutic Interventions:  Engaged in exposure therapy, having patient look at fears/fear hierarchy and utilize coping skills to face exposures., Provided positive reinforcement for progress towards goals, gains in knowledge, and application of skills previously taught.    Has a specific means been identified for suicidal.homicide  actions: No    Patient coping skills attempted to reduce the crisis:  Help-seeking, working with [facility]     Collateral contact information:  Abiodun Bernardo ([facility] ): [phone]    Legal Status: Voluntary/Patient has signed consent for treatment                         Reviewed court records: yes     Psychiatry Consult: n/a    Roberto Pereira Psychotherapist Trainee
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
## 2025-06-30 ENCOUNTER — PATIENT OUTREACH (OUTPATIENT)
Dept: CARE COORDINATION | Facility: CLINIC | Age: 45
End: 2025-06-30
Payer: COMMERCIAL

## 2025-07-05 ENCOUNTER — TELEPHONE (OUTPATIENT)
Dept: URGENT CARE | Facility: URGENT CARE | Age: 45
End: 2025-07-05

## 2025-07-05 ENCOUNTER — OFFICE VISIT (OUTPATIENT)
Dept: URGENT CARE | Facility: URGENT CARE | Age: 45
End: 2025-07-05
Payer: COMMERCIAL

## 2025-07-05 VITALS
RESPIRATION RATE: 16 BRPM | HEIGHT: 66 IN | SYSTOLIC BLOOD PRESSURE: 124 MMHG | TEMPERATURE: 97.3 F | WEIGHT: 266 LBS | DIASTOLIC BLOOD PRESSURE: 80 MMHG | BODY MASS INDEX: 42.75 KG/M2 | HEART RATE: 73 BPM | OXYGEN SATURATION: 97 %

## 2025-07-05 DIAGNOSIS — Z59.82 TRANSPORTATION INSECURITY: ICD-10-CM

## 2025-07-05 DIAGNOSIS — N39.498 OTHER URINARY INCONTINENCE: ICD-10-CM

## 2025-07-05 DIAGNOSIS — Z74.9 DEPENDENCE ON CARE PROVIDER: ICD-10-CM

## 2025-07-05 DIAGNOSIS — M17.12 PRIMARY OSTEOARTHRITIS OF LEFT KNEE: Primary | ICD-10-CM

## 2025-07-05 PROCEDURE — 99213 OFFICE O/P EST LOW 20 MIN: CPT | Performed by: INTERNAL MEDICINE

## 2025-07-05 PROCEDURE — 3074F SYST BP LT 130 MM HG: CPT | Performed by: INTERNAL MEDICINE

## 2025-07-05 PROCEDURE — 3079F DIAST BP 80-89 MM HG: CPT | Performed by: INTERNAL MEDICINE

## 2025-07-05 PROCEDURE — 1125F AMNT PAIN NOTED PAIN PRSNT: CPT | Performed by: INTERNAL MEDICINE

## 2025-07-05 RX ORDER — DIAPER,BRIEF,ADULT, DISPOSABLE
EACH MISCELLANEOUS
Qty: 17 EACH | Refills: 11 | Status: SHIPPED | OUTPATIENT
Start: 2025-07-05

## 2025-07-05 SDOH — ECONOMIC STABILITY - TRANSPORTATION SECURITY: TRANSPORTATION INSECURITY: Z59.82

## 2025-07-05 ASSESSMENT — PAIN SCALES - GENERAL: PAINLEVEL_OUTOF10: SEVERE PAIN (10)

## 2025-07-05 NOTE — Clinical Note
French Tate,  Saw Sonja in  today. The primary issue was her left knee but a number of secondary issues came up regarding the adequacy of her current living situation.  Her new  (Valentine) is wondering if this situation is optimal as they've been struggling with urinary incontinence, mobility challenges.  Can you please work with clinic  to start exploring options?  Sonja gave permission for us to contact Valentine directly at [phone].  I suspect something more mechanical with her knee and have asked for her to get seen by sports medicine again.  Possible benefit of MRI.  Consider the best method for supporting her mobility.    Thank you! Zander Valdez MD

## 2025-07-05 NOTE — PROGRESS NOTES
Urgent Care Clinic Visit    Chief Complaint   Patient presents with    Urgent Care    Knee Pain     Pt in clinic c/o severe left knee pain.                7/5/2025     3:21 PM   Additional Questions   Roomed by ben   Accompanied by friend Valentine

## 2025-07-05 NOTE — PATIENT INSTRUCTIONS
I will forward notes to Dr. Tate to reach out regarding other living options and how we can help with that.  Clinic  may be helpful there.    Try topical anti-inflammatory for the knee.      Relative to the urinary incontinence, it will be worth us making sure that we don't see evidence of urinary tract infection.

## 2025-07-05 NOTE — PROGRESS NOTES
"SUBJECTIVE:  Chief complaint of left knee pain.  She reports a history of arthritis in the knee.  Review of the medical record shows that she has had several visits with left knee pain in the past several months.  X-rays show some osteoarthritis.  She has had rheumatology referral and has appointment in August. Seen by Dr. Will for sports medicine in May and had steroid injection with short-term benefit.  The pain is primarily experienced on the medial portion of the knee with weight bearing and with knee extension.  Has some resting pain and throbbing but then will experience a very sharp and more intense pain with any weight-bearing and on stairs.  She is asking for a knee brace.  She does have crutches from previous injury. Also incidentally noting urinary incontinence which is more of an urgency issue.  The patient is accompanied today by a new  (Valentine) from her group home.  The patient gives consent for communication directly with Valentine.    PMH: Schizoaffective d/o, type 2 DM, morbid obesity, SLE with lupus nephritis    SOCH: lives in a group home on the third floor and she cannot avoid stairs that must be navigated to get to her room;  (Valentine  - [phone]).      ROS:  The following systems have been completely reviewed and are negative except as noted in the HPI: CONSTITUTIONAL, CARDIOVASCULAR, PULMONARY, GENITOURINARY, DERMATOLOGIC, and MUSCULOSKELETAL    OBJECTIVE:  /80   Pulse 73   Temp 97.3  F (36.3  C) (Temporal)   Resp 16   Ht 1.676 m (5' 6\")   Wt 120.7 kg (266 lb)   LMP  (LMP Unknown)   SpO2 97%   BMI 42.93 kg/m    GENERAL: alert and interactive  CV: regular rates and rhythm, normal S1 S2, no S3 or S4 and no murmur, click or rub -  EXT: 1+ pitting edema of both lower extremities is symmetric; superficial varicosities  M/SKEL: exam is limited by adiposity; focal tenderness of the medial joint line; sharp pain with passive extension to at 5 degrees, " tolerates full flexion passively; positive pivot shift    ASSESSMENT/PLAN:    ICD-10-CM    1. Primary osteoarthritis of left knee  M17.12 diclofenac (VOLTAREN) 1 % topical gel     Orthopedic  Referral     Radiographic evidence of some joint degeneration but the patient's pain and disability are out of proportion to the radiographic findings.  The character of the pain and physical exam is suggesting more of a focal mechanical issue with the knee and some impingement (possible medial meniscus injury ??).  Given the degree of mobility impairment and the duration of issues, a repeat assessment by Dr. Will would be helpful to consider the role of MRI to further clarify diagnosis and to consider the best approach for supporting mobility through mechanical support.  All of this is complicated by the patient's social determinants (inadequate transportation, self-care deficits) and challenges with getting to medical appointments.      2. Other urinary incontinence  N39.498 Incontinence Supply Disposable (DEPEND UNDERWEAR LARGE/XL) MISC     UA Macroscopic with reflex to Microscopic and Culture - Clinic Collect      3. Transportation insecurity  Z59.82       4. Dependence on care provider  Z74.9      Patient's new  (Valentine) asking about whether the current living arrangement is optimal for the Sonja given her mobility challenges.  Will reach out to PCP to ask for social work engagement with Valentine to consider options.  More regular follow-up with PCP would be optimal if possible.          Zander Valdez MD

## 2025-07-07 ENCOUNTER — HOSPITAL ENCOUNTER (OUTPATIENT)
Dept: BEHAVIORAL HEALTH | Facility: CLINIC | Age: 45
Discharge: HOME | End: 2025-07-07
Attending: FAMILY MEDICINE
Payer: COMMERCIAL

## 2025-07-07 ENCOUNTER — NURSE TRIAGE (OUTPATIENT)
Dept: FAMILY MEDICINE | Facility: CLINIC | Age: 45
End: 2025-07-07
Payer: COMMERCIAL

## 2025-07-07 DIAGNOSIS — F19.99 SUBSTANCE-RELATED DISORDER (H): ICD-10-CM

## 2025-07-07 DIAGNOSIS — R44.3 HALLUCINATIONS: ICD-10-CM

## 2025-07-07 DIAGNOSIS — F25.0 SCHIZOAFFECTIVE DISORDER, BIPOLAR TYPE (H): ICD-10-CM

## 2025-07-07 PROCEDURE — 90791 PSYCH DIAGNOSTIC EVALUATION: CPT

## 2025-07-07 RX ORDER — LAMOTRIGINE 25(21)-50
KIT ORAL
COMMUNITY

## 2025-07-07 RX ORDER — CLONIDINE HYDROCHLORIDE 0.1 MG/1
0.1 TABLET ORAL 2 TIMES DAILY
COMMUNITY

## 2025-07-07 ASSESSMENT — ANXIETY QUESTIONNAIRES
5. BEING SO RESTLESS THAT IT IS HARD TO SIT STILL: MORE THAN HALF THE DAYS
IF YOU CHECKED OFF ANY PROBLEMS ON THIS QUESTIONNAIRE, HOW DIFFICULT HAVE THESE PROBLEMS MADE IT FOR YOU TO DO YOUR WORK, TAKE CARE OF THINGS AT HOME, OR GET ALONG WITH OTHER PEOPLE: VERY DIFFICULT
3. WORRYING TOO MUCH ABOUT DIFFERENT THINGS: NEARLY EVERY DAY
6. BECOMING EASILY ANNOYED OR IRRITABLE: MORE THAN HALF THE DAYS
7. FEELING AFRAID AS IF SOMETHING AWFUL MIGHT HAPPEN: SEVERAL DAYS
GAD7 TOTAL SCORE: 16
4. TROUBLE RELAXING: MORE THAN HALF THE DAYS
7. FEELING AFRAID AS IF SOMETHING AWFUL MIGHT HAPPEN: SEVERAL DAYS
1. FEELING NERVOUS, ANXIOUS, OR ON EDGE: NEARLY EVERY DAY
2. NOT BEING ABLE TO STOP OR CONTROL WORRYING: NEARLY EVERY DAY
GAD7 TOTAL SCORE: 16
8. IF YOU CHECKED OFF ANY PROBLEMS, HOW DIFFICULT HAVE THESE MADE IT FOR YOU TO DO YOUR WORK, TAKE CARE OF THINGS AT HOME, OR GET ALONG WITH OTHER PEOPLE?: VERY DIFFICULT
GAD7 TOTAL SCORE: 16

## 2025-07-07 ASSESSMENT — COLUMBIA-SUICIDE SEVERITY RATING SCALE - C-SSRS
3. HAVE YOU BEEN THINKING ABOUT HOW YOU MIGHT KILL YOURSELF?: YES
1. IN THE PAST MONTH, HAVE YOU WISHED YOU WERE DEAD OR WISHED YOU COULD GO TO SLEEP AND NOT WAKE UP?: YES
5. HAVE YOU STARTED TO WORK OUT OR WORKED OUT THE DETAILS OF HOW TO KILL YOURSELF? DO YOU INTEND TO CARRY OUT THIS PLAN?: YES
1. IN THE PAST MONTH, HAVE YOU WISHED YOU WERE DEAD OR WISHED YOU COULD GO TO SLEEP AND NOT WAKE UP?: PASSIVE THOUGHTS WITH NO MEAN OR PLAN
4. HAVE YOU HAD THESE THOUGHTS AND HAD SOME INTENTION OF ACTING ON THEM?: NO
4. HAVE YOU HAD THESE THOUGHTS AND HAD SOME INTENTION OF ACTING ON THEM?: YES
2. HAVE YOU ACTUALLY HAD ANY THOUGHTS OF KILLING YOURSELF?: YES
1. HAVE YOU WISHED YOU WERE DEAD OR WISHED YOU COULD GO TO SLEEP AND NOT WAKE UP?: YES
2. HAVE YOU ACTUALLY HAD ANY THOUGHTS OF KILLING YOURSELF?: NO
5. HAVE YOU STARTED TO WORK OUT OR WORKED OUT THE DETAILS OF HOW TO KILL YOURSELF? DO YOU INTEND TO CARRY OUT THIS PLAN?: NO

## 2025-07-07 ASSESSMENT — PATIENT HEALTH QUESTIONNAIRE - PHQ9
10. IF YOU CHECKED OFF ANY PROBLEMS, HOW DIFFICULT HAVE THESE PROBLEMS MADE IT FOR YOU TO DO YOUR WORK, TAKE CARE OF THINGS AT HOME, OR GET ALONG WITH OTHER PEOPLE: EXTREMELY DIFFICULT
SUM OF ALL RESPONSES TO PHQ QUESTIONS 1-9: 20
SUM OF ALL RESPONSES TO PHQ QUESTIONS 1-9: 20

## 2025-07-07 ASSESSMENT — PAIN SCALES - GENERAL: PAINLEVEL_OUTOF10: SEVERE PAIN (10)

## 2025-07-07 NOTE — ADDENDUM NOTE
Encounter addended by: Eric Castañeda LPCC, HELENC on: 7/7/2025 3:53 PM   Actions taken: Clinical Note Signed
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
## 2025-07-08 ENCOUNTER — TELEPHONE (OUTPATIENT)
Dept: BEHAVIORAL HEALTH | Facility: CLINIC | Age: 45
End: 2025-07-08
Payer: COMMERCIAL

## 2025-07-08 NOTE — TELEPHONE ENCOUNTER
----- Message from Carlos Elizabeth sent at 7/8/2025  1:12 PM CDT -----  Regarding: Sonja KENDRICK  Scheduling Request    Patient Name: Corinna J Schoen  Location of programming: [facility]  Start Date: July / 10 / 2025  Group: E on Thursday at 12:00 PM  Attending Provider (MD): Anna  Duration of Appointment in minutes: 840  Number of Visits 28  Visit Type: In-person or Treatment - 870

## 2025-07-10 ENCOUNTER — TELEPHONE (OUTPATIENT)
Dept: BEHAVIORAL HEALTH | Facility: CLINIC | Age: 45
End: 2025-07-10
Payer: COMMERCIAL

## 2025-07-10 NOTE — TELEPHONE ENCOUNTER
----- Message from Jayda Reyes sent at 7/10/2025 11:39 AM CDT -----  Regarding: cancel admisison  Please cancel today's 12 pm admission for this pt. There may be a reschedule in the future, but as of right now there is no plan to schedule her. Thanks!    Humberto

## 2025-07-11 ENCOUNTER — HOSPITAL ENCOUNTER (OUTPATIENT)
Dept: BEHAVIORAL HEALTH | Facility: CLINIC | Age: 45
Discharge: HOME | End: 2025-07-11
Attending: FAMILY MEDICINE
Payer: COMMERCIAL

## 2025-07-11 ENCOUNTER — BEH TREATMENT PLAN (OUTPATIENT)
Dept: BEHAVIORAL HEALTH | Facility: CLINIC | Age: 45
End: 2025-07-11
Attending: FAMILY MEDICINE

## 2025-07-11 DIAGNOSIS — F17.200 NICOTINE DEPENDENCE: Primary | ICD-10-CM

## 2025-07-11 DIAGNOSIS — F19.99 SUBSTANCE-RELATED DISORDER (H): Primary | ICD-10-CM

## 2025-07-11 DIAGNOSIS — Z72.51 HISTORY OF UNPROTECTED SEX: ICD-10-CM

## 2025-07-11 DIAGNOSIS — F19.90 SUBSTANCE USE DISORDER: Primary | ICD-10-CM

## 2025-07-11 LAB — CREAT UR-MCNC: 46 MG/DL

## 2025-07-11 PROCEDURE — 1002N00001 HC LODGING PLUS FACILITY CHARGE ADULT

## 2025-07-11 PROCEDURE — H2035 A/D TX PROGRAM, PER HOUR: HCPCS

## 2025-07-11 PROCEDURE — 80353 DRUG SCREENING COCAINE: CPT | Performed by: FAMILY MEDICINE

## 2025-07-11 RX ORDER — GUAIFENESIN 200 MG/10ML
200 LIQUID ORAL EVERY 4 HOURS PRN
COMMUNITY

## 2025-07-11 RX ORDER — LORATADINE 10 MG/1
10 TABLET ORAL DAILY
COMMUNITY

## 2025-07-11 RX ORDER — GABAPENTIN 300 MG/1
300 CAPSULE ORAL 3 TIMES DAILY
COMMUNITY

## 2025-07-11 RX ORDER — LAMOTRIGINE 25 MG/1
25 TABLET ORAL DAILY
COMMUNITY

## 2025-07-11 RX ORDER — NICOTINE 21 MG/24HR
1 PATCH, TRANSDERMAL 24 HOURS TRANSDERMAL EVERY 24 HOURS
Qty: 28 PATCH | Refills: 1 | Status: SHIPPED | OUTPATIENT
Start: 2025-07-11

## 2025-07-11 RX ORDER — AMOXICILLIN 250 MG
2 CAPSULE ORAL DAILY PRN
COMMUNITY

## 2025-07-11 RX ORDER — ALBUTEROL SULFATE 90 UG/1
2 INHALANT RESPIRATORY (INHALATION) EVERY 6 HOURS PRN
COMMUNITY
End: 2025-07-11

## 2025-07-11 ASSESSMENT — PATIENT HEALTH QUESTIONNAIRE - PHQ9
SUM OF ALL RESPONSES TO PHQ QUESTIONS 1-9: 22
10. IF YOU CHECKED OFF ANY PROBLEMS, HOW DIFFICULT HAVE THESE PROBLEMS MADE IT FOR YOU TO DO YOUR WORK, TAKE CARE OF THINGS AT HOME, OR GET ALONG WITH OTHER PEOPLE: VERY DIFFICULT
SUM OF ALL RESPONSES TO PHQ QUESTIONS 1-9: 22

## 2025-07-11 NOTE — PROGRESS NOTES
Initial Services Plan    Before your first treatment group, please do the following    Immediate health & safety concerns: Look for sober housing and a supportive social network.  Look for a support network (such as AA, NA, DBT group, a Cheondoism group, etc.)    Suggestions for client during the time between intake & completion of treatment plan:  Tour your treatment center (unit or outpatient clinic).  Introduce yourself to the treatment group.  Spend time getting to know your peers.  Complete your psycho-social paperwork.  Complete the problem list for your treatment plan.  Start drug and alcohol use history.  Review your patient or client handbook.    Client issues to be addressed in the first treatment sessions:  Identify concerns about contacting bosSIL dickens  Other Pt needs help getting info for her PO. Pt needs help calling for  waver and social security.    HANS Mcgregor  7/11/2025

## 2025-07-11 NOTE — PROGRESS NOTES
Writer met with pt and explained program expectations around group attendance. Pt was made aware she would need to get herself to meals and programming on time. Pt verbalized understanding.
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
## 2025-07-11 NOTE — PROGRESS NOTES
[facility] Assessment      Vital signs:     See flow sheets      Direct admission    Counselor: group E  Drug of Choice: Meth   Last use: 3 days ago   Home clinic/MD:   Team Member Role and Specialty Contact Info Address Start End Comments   Kendell Tate MD General (Internal Medicine) Phone: [phone]  Fax: [phone] [address]  [facility] [address] 3/21/2025 - -     Team Member Role and Specialty Contact Info Address Start End Comments   Tunde Little MD Physician (Rheumatology) Phone: [phone]  Fax: [phone] [address] [place] [address] 6/30/2025 - -     8/21/2025 Status: Scheduled    Time: 10:30 AM Length: 60   Visit Type: NEW RHEUMATOLOGY         ortho  Mary Will MD Department: [facility]     7/29/2025 Status: Scheduled    Time: 1:40 PM Length: 20   Visit Type: RETURN KNEE        Psychiatrist/therapist: [facility] provider  morris Colón via telehealth RENALDO completed and faxed   Next appt 7/15 1120     through the [facility] project Abiodun Bernardo RENALDO completed [phone]    Medical history/current conditions:  Medical History and Problem List   Asthma   Alcohol abuse   Cellulitis   Depression with suicide attempt   Factor V Leiden mutation   Fibromyalgia   GERD  Hypertension   Methamphetamine use   Migraines   Neuromuscular disorder  ONOFRE  PE  Pleural effusion   SLE  Schizoaffective disorder, bipolar type   Type II diabetes   Tobacco use   Lupus   Vitamin D deficiency     H&P Screen:  H&P within the last 90 days: Yes.  Date: 6/27/25 Location: [facility] [place] ED      Mental Health diagnosis: schizoaffective disorder,   Medication compliant?: yes  Recent sucidal thoughts? Pt reported that she has had thoughts of SI with a plan of cutting in the last month, but she denied having any SI thoughts or plan today. Pt had an attempt by cutting in 2023, per pt there has been no attempts or SIB since 20[facility] when [facility]. Pt verbalized understanding that they need to immediatly alert staff with any SI or other mental health concerns. [facility] staff notified of pt's SI and or other mental health diagnoses.       When? In the last month  Current thought of self-harm? no    Plan? na    Pain assessment:   Pt. Experiencing pain at this time?  Yes.  Rating on 0-10 scale: (1-10 scale): 8.  Location: knees  Chronic  Result of: arthritis.     LP Falls assessment    Has patient had a fall(s) in the last 6 months while they were NOT under the influence of ETOH or drugs?  Yes  If yes, what were the circumstances surrounding the fall(s)? Knee randomly gives out  Does patient have gait dysfunctions? (limping, dragging of toes, shuffling feet, unsteadiness, difficulty standing /walking)  Yes  Does patient appear to have deconditioning/muscle loss/malnutrition/fatigue? (due to inactivity, bedrest (long hospitalization), medical condition(s) No  Does patient experience confusion, dizziness or vertigo? No  Is patient visually impaired? Yes   Does patient have any adaptive equipment (hearing aids, wheelchair, walker, prosthesis, crutches, cane, etc..) Yes  Is patient taking 2 or more of the following medications?  anticonvulsants, anti-hypertensives, diuretics, laxatives, sedatives, and psychotropics (single-select) No  Is patient taking medication (s) that would cause urinary or bowel urgency (ex: lactulose/Furosemide)? No  Does patient have a medical condition (ex: Diabetes, Liver Disease, Respiratory Diseases, Chronic Pain, Heart Disease, Neuropathy, Seizure like Disorder, etc...) that could affect balance/gait or risk for falls? Yes    If yes to any of the above educate patient on fall prevention while at [facility].           Floors clutter/obstacle free           Proper footwear/[place] shoes          Encourage the use of appropriate lighting.            Adjust walking speed accordingly          Recommend caution going on  longer walks. Try shorter walks and see how you do first.  Use elevators when appropriate.          Notify staff if you are experiencing fatigue, weakness, drowsiness/ dizziness or have had a fall.           Use adaptive equipment as recommended          Wheelchairs must be managed and propelled independently without staff assistance           Expectation of complete independence with all cares and compliance.  Report to staff if having difficulty     LP patient who poses a potential falls risk will be advised of the following:  Please follow the recommendations as listed above or it may affect your treatment plan.  Your treatment team would have to evaluate if this level of care is appropriate for you.    Patient acknowledges and verbalizes understanding of the above criteria? Yes  Support staff / counselors (notified per nursing discretion)  concerning Fall Screen and plan of prevention. LPRN to re-assess as appropriate. White board and SBAR updated per nursing discretion Yes  __________________________________________________  RN Assessment of Infectious Disease concerns:    Infectious disease concerns   Infection Onset Last Indicated Last Received Source Reconcile Discard   ESBL 11/07/19 11/07/19 11/10/19 [facility]     Unmapped Infections    Infection Onset Last Indicated Last Received Source Reconcile Discard   MSSA 03/08/21 03/08/21 03/11/21 [facility]     Methicillin Susceptible Staphylococcus Aureus 03/08/21 03/11/21 03/11/21 [facility]     MDRO Clearance 11/07/19 02[facility]26/24 [facility]         If infectious disease concerns, LPRN to notify staff for appropriate rooming assignment OR discharge if pt is not appropriate to stay at LP Yes  Admitting nurse review the following with pt:  For pt's that are approved to stay at LP with history of Infectious Disease, pt will be in private room and have private bathroom. Pt must be educated  with appropriate hand hygeine and verbalize understanding.  Pt must verbalize that they understand and must comply with containing bodily fluids.  Pt will wash  laundry separately. Yes  Notify Infection Prevention with any concerns or questions   _____________________________________________________________________________________________________________________________     Community Medical Screen for COVID-19    Do you have any of the following NEW or worsening symptoms NOT attributed to pre-existing conditions?    No    Fever of 100.0  F (37.8 C) or over  Chills  Cough  Shortness of Breath  Loss of taste or smell  Generalized body aches  Persistent headache  Sore throat (or trouble eating or drinking in young children?)  Nausea, Vomiting, or diarrhea (loose stools)    If pt responds YES to any of the symptoms / Positive COVID-19  without symptoms pt will need to leave unit immediately and can return in 6 days from discharge date.      Did you test positive for COVID-19 in the last 10 days or are you waiting on the test results due to an exposure or symptoms?  No    Has anyone told you to self-quarantine due to exposure to someone with COVID-19?  No    COVID - 9 positive patients must quarantine for five days.  They can return to in-person therapy on day 6 following Duration of Special Precautions for COVID-19.      COVID-19 Test completed by LPRN ? No    COVID-19 - Pt informed of the following while at LP:    1) If pt has any of the symptoms below, notify staff immediately.    Fever   Cough   Shortness of breath or difficulty breathing   Chills   Repeated shaking with chills  Muscle pain   ____________________________________________________________________________________________________________________________    RN Assessment of Patient's Ability to Safely Manage and Self-Administer Respiratory Treatments    Has experience in the management of Respiratory (If NA, indicate and move to Integrative Therapies):  reports exercise induced asthma --will request an inhaler for pt, as she says she feels like she needs it frequently.     Including knowledge and understanding of the importance of:    Does pt have any of the following Respiratory Illness/disorders?   Asthma, COPD, RAD, Bronchitis, Emphysema, Cystic fibrosis, ONOFRE Yes    Which Acute or Chronic Respiratory Illness do you have which requires intervention? Asthma    Did pt bring all prescribed respiratory supplies? Oxygen Concentrator, CPAP, BiPap, Nebulizer, Nebulizer solution, scheduled inhaler, Rescue Inhaler  No-ordering for pt. She will alert staff immediatly if she is wheezing or SOB and will be escorted to ED   Pt understands they must carry  Rescue Inhalers  at all times Yes   Alerting staff with respiratory symptoms?  Wheezing, SOB, Tightness or pain in chest, Excessive Daytime Sleepiness Yes     Does the patient have the physical and mental ability to:     Perform respiratory cares? Oxygen Concentrator, CPAP, BiPap, Nebulizer tx, scheduled inhaler, Rescue Inhaler tx, respiratory medicines Yes   Determine when and how often to use respiratory treatments? (Oxygen Concentrator, CPAP, BiPap, Nebulizer tx, scheduled inhaler, Rescue Inhaler tx, respiratory medicines Yes     Therefore does the patient, present a risk of harm to themselves or other clients in the facility if allowed to self-administer Respiratory treatments.  Consider factors above.  No    I have assessed the patient to be able to safely administer respiratory treatments.  Yes    Patient tobacco use: pack a day     Are you interested in quitting? maybe    NRT (Nicotine Replacement therapy) ordered? yes   Pt is aware of the dangers of tobacco cessation and in contemplation.    Pt given written education.    Nutritional Assessment:    Have you ever purged, binged or restricted yourself as a way to control your weight?   No     Are you on a special diet?   No     Do you have any concerns regarding your  nutritional status?   Yes, explain: wants to lose weight for her knee pain     Have you had any appetite changes in the last 3 months?   No   Have you had weight loss or weight gain of more than 10 lbs in the last 3 months?   If patient gained or lost more than 10 lbs, then refer to program RN / attending Physician for assessment.   No   Was the patient informed of BMI?    Above,  General nutrition education   Yes   Have you engaged in any risk-taking behavior that would put you at risk for exposure to blood-borne or sexually transmitted diseases?   No recently tested   Do you have any dental problems?   No         Pt understands LP drug toxicology screening process yes    LPRN reviewed with pt the following information:  Yes  Pt informed if leaving AMA, they will be directed to take medications with them.  Should pt's choose to leave medications at LP, ALL medications will be packaged and delivered to inpatient pharmacy for temporary storage.  Inpt pharmacy will follow protocol to reach out to pt.  If pharmacy unable to reach pt and/or pt does not retrieve medications, they will be destroyed per inpt pharmacy protocol.   In regards to 'medical concerns/medication refill expectations' while at LP:  Pt understands LP has no rounding/managing provider to assess medical issues or to refill medications.  Pt's instructed to make virtual/phone appt/s with community provider/s and notify LPRN of date and time  Pt's understand they may not leave LP to attend any medical appt's.   Pt's understand they are responsible for having a plan to refill medications and to allow time to troubleshoot.      Pt verbalizes understanding of the above criteria yes    [facility]  Nurse Liaison / CD [facility]  O: [phone]  Fax:[phone]  LPRN [place] [id number]  M-F: 7AM to 5:30PM   Sat-Sun: 7AM to 3PM  After hours: [phone]     Nursing Assessment Summary:  As above    On-going nursing intervention  required?   No    Acute care visit recommended: no

## 2025-07-11 NOTE — PROGRESS NOTES
Name: Corinna J Schoen  Date: 7/11/2025  Medical Record: [id number]    Envelope Number: [id number]    List of Contents (List each item separately in new row):   Cell phone w/ cracked screen    Admission:  I am responsible for any personal items that are not sent to the safe or pharmacy.  [facility] is not responsible for loss, theft or damage of any property in my possession.      Patient Signature:  ___________________________________________       Date/Time:__________________________    Staff Signature: __________________________________       Date/Time:__________________________    [facility] Staff person, if patient is unable/unwilling to sign:      __________________________________________________________       Date/Time: __________________________    **All medications are packaged by LP staff and securely stored on [facility]. Medications left by patients at discharge will be packaged by LP staff and transported by LP staff to inpatient pharmacy for storage.**    Discharge:  [facility] has returned all of my personal belongings:    Patient Signature: ________________________________________     Date/Time: ____________________________________    Staff Signature: ______________________________________     Date/Time:_____________________________________

## 2025-07-11 NOTE — PROGRESS NOTES
"Progress Note      DAANES ID: [id number]    This patient had a IP Substance Use Disorder assessment on 7/7/2025 completed by HANS Arreola.  This patient was seen for a face to face update of the IP Substance Use Disorder assessment on 7/11/2025 by HANS Mcgregor.  INSIDE: The patient's IP Substance Use Disorder assessment completed on 7/7/2025 is in the patient's electronic medical record in Epic in the Chart Review section under the Notes/Trans Tab.    Alcohol/Drug use since the last CD evaluation (include date of last use):     Pt reports last date of use for meth as \"two days ago\" on 7/9/2025     Please note any other clinical changes since the last CD evaluation (such as medication changes, additional legal charges, detoxification admissions, overdoses, etc.)     No significant changes since the last CD evaluation       ASAM Dimensions Original scores Current Scores   I.) Intoxication and Withdrawal: 1 1   II.) Biomedical:  1 1   III.) Emotional and Behavioral:  1 1   IV.) Readiness to Change:  3 3   V.) Relapse Potential: 4 4   VI.) Recovery Environmental: 3 3     Please list clinical justifications for the above ASAM score changes since the original comprehensive assessment:     None of the ASAM scores on the six dimensions had changed since the IP Substance Use Disorder assessment was completed on 7/7/2025.       Current ALFREDO: Current UA:     0.000     Positive for Amphetamines, Methamphetamine, MDMA, and THC and negative for all other screened drugs.       PHQ-9, NEPTALI-7   PHQ-9 on 7/11/2025 NEPTALI-7 on 7/11/2025   The patient's PHQ-9 score was 22 out of 27, indicating severe depression.   The patient's NEPTALI-7 score was 16 out of 21, indicating severe anxiety.       [place]-Suicide Severity Rating Scale Reassessment   Have you ever wished you were dead or that you could go to sleep and not wake up?  Past Month:  Yes     Have you actually had any thoughts of killing yourself?  Past Month:  Yes     Have you " been thinking about how you might do this?     Past Month:  Yes, Describe: Pt reports she would try cutting again, similar to in 2023   Lifetime:  Yes, Describe: Pt reports attempt in 2023 via cutting her wrist   Have you had these thoughts and had some intention of acting on them?     Past Month:  No   Lifetime:  Yes, Describe: Pt reports attempt in 2023 via cutting her wrist   Have you started to work out the details of how to kill yourself?   Past Month:  No   Lifetime:  Yes, Describe: Pt reports attempt in 2023 via cutting her wrist   Do you intend to carry out this plan?   No     When you have the thoughts how long do they last?  Less than 1 hour/some of the time     Are there things - anyone or anything (i.e. family, Hinduism, pain of death) that stopped you from wanting to die or acting on thoughts of suicide?  Protective factors most likely did not stop you       2008  [employer]  Used with permission by Chelsy Molina, PhD.       Guide to C-SSRS Risk Ratings   NO IDEATION:  with no active thoughts IDEATION: with a wish to die. IDEATION: with active thoughts. Risk Ratings   If Yes No No 0 - Very Low Risk   If NA Yes No 1 - Low Risk   If NA Yes Yes 2 - Low/moderate risk   IDEATION: associated thoughts of methods without intent or plan INTENT: Intent to follow through on suicide PLAN: Plan to follow through on suicide Risk Ratings cont...   If Yes No No 3 - Moderate Risk   If Yes Yes No 4 - High Risk   If Yes Yes Yes 5 - High Risk   The patient's ADDITIONAL RISK FACTORS and lack of PROTECTIVE FACTORS may increase their overall suicide risk ratings.     Additional Risk Factors:   Someone close to the patient (family member/friend) completed a suicide    Significant history of having untreated or poorly treated mental health symptoms    Significant history of physical illness or chronic medical problems    Significant history of untreated or poorly treated chronic pain  issues    Tendency to be socially isolated and/or cut off from the support of others    A recent death of someone close to the patient and/or unresolved grief and loss issues    A recent loss that was significant to the patient, i.e. loss of job, loss of home, divorce, break-up, etc.    Significant history of trauma and/or abuse issues    History of impulsive or aggressive behaviors   Protective Factors:   Having people in his/her life that would prevent the patient from considering a suicide attempt (i.e. young children, spouse, parents, etc.)    A positive relationship with his/her clinical medical and/or mental health providers    Having cultural, Faith or spiritual beliefs that discourage suicide     Risk Status   3. - Moderate Risk: Document in Epic / SBAR to counselor     Additional information to support suicide risk rating: There was no additional information to provide at this time.

## 2025-07-12 ENCOUNTER — HOSPITAL ENCOUNTER (OUTPATIENT)
Dept: BEHAVIORAL HEALTH | Facility: CLINIC | Age: 45
Discharge: HOME | End: 2025-07-12
Attending: FAMILY MEDICINE
Payer: COMMERCIAL

## 2025-07-12 PROCEDURE — H2035 A/D TX PROGRAM, PER HOUR: HCPCS | Mod: HQ

## 2025-07-12 PROCEDURE — 1002N00001 HC LODGING PLUS FACILITY CHARGE ADULT

## 2025-07-12 RX ORDER — ALBUTEROL SULFATE 90 UG/1
2 INHALANT RESPIRATORY (INHALATION) EVERY 6 HOURS PRN
Qty: 18 G | Refills: 0 | Status: SHIPPED | OUTPATIENT
Start: 2025-07-12

## 2025-07-12 ASSESSMENT — COLUMBIA-SUICIDE SEVERITY RATING SCALE - C-SSRS
1. SINCE LAST CONTACT, HAVE YOU WISHED YOU WERE DEAD OR WISHED YOU COULD GO TO SLEEP AND NOT WAKE UP?: NO
2. HAVE YOU ACTUALLY HAD ANY THOUGHTS OF KILLING YOURSELF?: NO
TOTAL  NUMBER OF ABORTED OR SELF INTERRUPTED ATTEMPTS SINCE LAST CONTACT: NO
TOTAL  NUMBER OF INTERRUPTED ATTEMPTS SINCE LAST CONTACT: NO
6. HAVE YOU EVER DONE ANYTHING, STARTED TO DO ANYTHING, OR PREPARED TO DO ANYTHING TO END YOUR LIFE?: NO
ATTEMPT SINCE LAST CONTACT: NO
SUICIDE, SINCE LAST CONTACT: NO

## 2025-07-12 NOTE — PROGRESS NOTES
Name: Corinna J Schoen  Date: 7/11/2025  Medical Record: [id number]    Envelope Number: [id number]    List of Contents (List each item separately in new row):   3 tablets of Clonidine in a Lamotrigine bottle    Admission:  I am responsible for any personal items that are not sent to the safe or pharmacy.  [facility] is not responsible for loss, theft or damage of any property in my possession.      Patient Signature:  ___________________________________________       Date/Time:__________________________    Staff Signature: __________________________________       Date/Time:__________________________    [facility] Staff person, if patient is unable/unwilling to sign:      __________________________________________________________       Date/Time: __________________________    **All medications are packaged by LP staff and securely stored on [facility]. Medications left by patients at discharge will be packaged by LP staff and transported by LP staff to inpatient pharmacy for storage.**    Discharge:  [facility] has returned all of my personal belongings:    Patient Signature: ________________________________________     Date/Time: ____________________________________    Staff Signature: ______________________________________     Date/Time:_____________________________________

## 2025-07-12 NOTE — GROUP NOTE
Group Therapy Documentation    PATIENT'S NAME: Corinna J Schoen  MRN:   [id number]  :   1980  ACCT. NUMBER: [id number]  DATE OF SERVICE: 25  START TIME:  9:00 AM  END TIME: 11:00 AM  FACILITATOR(S): Jenni Denny LADC; Loida Hernadez LADC; Jose Silva LADC  TOPIC: BEH Group Therapy  Number of patients attending the group:  24  Group Length:  2 Hours    Dimensions addressed: 2, 3, 4, 5, and 6    Summary of Group / Topics Discussed:    Sober coping skills, Relationship/socialization, and Relapse prevention    Group lecture was facilitated by counseling staff on the topic of boundaries and assertive communication. Participants provided feedback throughout group session.     Group Attendance:  Attended group session    Patient's response to the group topic/interactions:  cooperative with task    Patient appeared to be Actively participating.        Client specific details:  Patient actively engaged in group discussion.    Suicide ideation: 0.         2025     3:00 PM   Suicide Ideation Check In   Since last session, how often have you had suicidal thoughts? No thoughts of suicide
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
## 2025-07-12 NOTE — GROUP NOTE
Group Therapy Documentation    PATIENT'S NAME: Corinna J Schoen  MRN:   [id number]  :   1980  ACCT. NUMBER: [id number]  DATE OF SERVICE: 25  START TIME: 12:30 PM  END TIME:  2:30 PM  FACILITATOR(S): Jenni Denny [facility]; Loida Hernadez LAD; Jose Silva [facility]  TOPIC: BEH Group Therapy  Number of patients attending the group:  24  Group Length:  2 Hours    Dimensions addressed: 3, 4, 5, and 6    Summary of Group / Topics Discussed:    Recovery Principles, Sober coping skills, Relationship/socialization, Balanced lifestyle, Co-occurring illnesses symptom management, Coping/DBT informed care, Disease of addiction, Emotions/expression, Relapse prevention, and Self-care activities  Discussion and activity based on emotional intelligence and emotion charades.     Group Attendance:  Attended group session    Patient's response to the group topic/interactions:  cooperative with task    Patient appeared to be Passively engaged.        Client specific details:  Sonja attended pm primary group. [place] engaged with facilitator prompts.

## 2025-07-13 ENCOUNTER — HOSPITAL ENCOUNTER (OUTPATIENT)
Dept: BEHAVIORAL HEALTH | Facility: CLINIC | Age: 45
Discharge: HOME | End: 2025-07-13
Attending: FAMILY MEDICINE
Payer: COMMERCIAL

## 2025-07-13 PROBLEM — F19.90 SUBSTANCE USE DISORDER: Status: ACTIVE | Noted: 2025-07-13

## 2025-07-13 PROCEDURE — 1002N00001 HC LODGING PLUS FACILITY CHARGE ADULT

## 2025-07-13 PROCEDURE — H2035 A/D TX PROGRAM, PER HOUR: HCPCS | Mod: HQ

## 2025-07-13 NOTE — GROUP NOTE
Group Therapy Documentation    PATIENT'S NAME: Corinna J Schoen  MRN:   [id number]  :   1980  ACCT. NUMBER: [id number]  DATE OF SERVICE: 25  START TIME: 12:30 PM  END TIME:  2:30 PM  FACILITATOR(S): Loida Hernadez LADC; Jose Silva LADC  TOPIC: BEH Group Therapy  Number of patients attending the group:  23  Group Length:  1 Hours    Dimensions addressed: 3, 4, 5, and 6    Summary of Group / Topics Discussed:    Spiritual Care, Sober coping skills, and Self-care activities    Patients listened to a lecture on basic yoga principles and the benefits for physical and emotional health.    Group Attendance:  Attended group session    Patient's response to the group topic/interactions:  cooperative with task    Patient appeared to be Attentive and Engaged.        Client specific details: Sonja was an active participant in skills group on yoga.

## 2025-07-13 NOTE — GROUP NOTE
Group Therapy Documentation    PATIENT'S NAME: Corinna J Schoen  MRN:   [id number]  :   1980  ACCT. NUMBER: [id number]  DATE OF SERVICE: 25  START TIME:  8:45 AM  END TIME: 10:30 AM  FACILITATOR(S): Denice De La Cruz RN; Loida Hernadez [facility]; Jose [facility]  TOPIC: BEH Group Therapy  Number of patients attending the group:  24  Group Length:  2 Hours    Dimensions addressed: 3, 4, and 5    Summary of Group / Topics Discussed:    Sober coping skills and Self-care activities      Group lecture was presented by nursing staff on regarding self-care activities. Participants provided feedback throughout group session.     Group Attendance:  Attended group session    Patient's response to the group topic/interactions:  cooperative with task    Patient appeared to be Actively participating.        Client specific details:  Patient actively engaged in group discussion.    Suicide ideation: 0.         2025    11:00 AM   Suicide Ideation Check In   Since last session, how often have you had suicidal thoughts? No thoughts of suicide

## 2025-07-14 ENCOUNTER — HOSPITAL ENCOUNTER (OUTPATIENT)
Dept: BEHAVIORAL HEALTH | Facility: CLINIC | Age: 45
Discharge: HOME | End: 2025-07-14
Attending: FAMILY MEDICINE
Payer: COMMERCIAL

## 2025-07-14 ENCOUNTER — TELEPHONE (OUTPATIENT)
Dept: BEHAVIORAL HEALTH | Facility: CLINIC | Age: 45
End: 2025-07-14
Payer: COMMERCIAL

## 2025-07-14 PROCEDURE — H2035 A/D TX PROGRAM, PER HOUR: HCPCS | Mod: HQ

## 2025-07-14 PROCEDURE — 1002N00001 HC LODGING PLUS FACILITY CHARGE ADULT

## 2025-07-14 ASSESSMENT — ANXIETY QUESTIONNAIRES
3. WORRYING TOO MUCH ABOUT DIFFERENT THINGS: NEARLY EVERY DAY
4. TROUBLE RELAXING: NOT AT ALL
6. BECOMING EASILY ANNOYED OR IRRITABLE: NEARLY EVERY DAY
2. FELT ANXIOUS, WORRIED, OR NERVOUS: NEARLY EVERY DAY
2. NOT BEING ABLE TO STOP OR CONTROL WORRYING: NEARLY EVERY DAY
7. FEELING AFRAID AS IF SOMETHING AWFUL MIGHT HAPPEN: MORE THAN HALF THE DAYS
5. BEING SO RESTLESS THAT IT IS HARD TO SIT STILL: NEARLY EVERY DAY
GAD7 TOTAL SCORE: 17

## 2025-07-14 NOTE — GROUP NOTE
Group Therapy Documentation    PATIENT'S NAME: Corinna J Schoen  MRN:   [id number]  :   1980  ACCT. NUMBER: [id number]  DATE OF SERVICE: 25  START TIME:  9:00 AM  END TIME: 11:00 AM  FACILITATOR(S): Jenni Denny LADC  TOPIC: BEH Group Therapy  Number of patients attending the group:  5  Group Length:  2 Hours    Dimensions addressed: 3, 4, 5, and 6    Summary of Group / Topics Discussed:    Sober coping skills, Relationship/socialization, Co-occurring illnesses symptom management, Disease of addiction, Emotions/expression, and Relapse prevention  Daily group check-in; assignment presentation and feedbacks, graduation ceremony for a peer.    Group Attendance:  Attended group session    Patient's response to the group topic/interactions:  cooperative with task    Patient appeared to be Engaged.        Client specific details:  Sonja shared feeling grateful. Sonja was asked to stand because she was appearing sleepy but was observed to be trying. Sonja shared feedbacks with writer prompts.   Suicide ideation: 0.         2025    11:00 AM   Suicide Ideation Check In   Since last session, how often have you had suicidal thoughts? No thoughts of suicide

## 2025-07-14 NOTE — TELEPHONE ENCOUNTER
Addiction Medicine Appointment Request    Referring Provider Name: Halie Castillo MD    Please indicate if this if this is a NEW patient visit or FOLLOW UP:  New    Diagnosis: Stimulant Use Disorder    [facility] Admission Date 7/11    [facility] Discharge Date 8/8 (PLEASE do not schedule appointments on discharge date)     Needs: NO

## 2025-07-14 NOTE — GROUP NOTE
Group Therapy Documentation    PATIENT'S NAME: Corinna J Schoen  MRN:   [id number]  :   1980  ACCT. NUMBER: [id number]  DATE OF SERVICE: 25  START TIME: 12:30 PM  END TIME:  2:30 PM  FACILITATOR(S): She Syed LADC  TOPIC: BEH Group Therapy  Number of patients attending the group:  7  Group Length:  2 Hours    Dimensions addressed: 4, 5, and 6    Summary of Group / Topics Discussed:    Recovery Principles, Sober coping skills, Disease of addiction, Leisure explorations/use of leisure time, and Relapse prevention  Patients viewed an addiction/mental health based film. This film addressed: addiction as a disease, relationships and addiction, engagement in AA, and evaluating priorities when getting sober.   Patients engaged in a thorough discussion about the film, and shared personal experiences, and how the film helped them process their own life events.         Group Attendance:  Excused from group session due to completing admission.

## 2025-07-14 NOTE — GROUP NOTE
Group Therapy Documentation    PATIENT'S NAME: Corinna J Schoen  MRN:   [id number]  :   1980  ACCT. NUMBER: [id number]  DATE OF SERVICE: 25  START TIME: 12:30 PM  END TIME:  2:30 PM  FACILITATOR(S): She Syed LADC  TOPIC: BEH Group Therapy  Number of patients attending the group:  5  Group Length:  2 Hours    Dimensions addressed: 3, 4, and 5    Summary of Group / Topics Discussed:    Recovery Principles    Group Objectives:   -Daily Check Ins  -Question of the Day  -Read Daily Meditations and discussion of related themes.   -Present Treatment Plan Assignments  - Group Process Time                Group Attendance:  Attended group session    Patient's response to the group topic/interactions:  cooperative with task    Patient appeared to be Actively participating.        Client specific details:  Sonja Attended small group therapy. Patient engaged in discussion and participated in sharing feedback to their peers.   .

## 2025-07-15 ENCOUNTER — HOSPITAL ENCOUNTER (OUTPATIENT)
Dept: BEHAVIORAL HEALTH | Facility: CLINIC | Age: 45
Discharge: HOME | End: 2025-07-15
Attending: FAMILY MEDICINE
Payer: COMMERCIAL

## 2025-07-15 LAB
AMPHET UR CFM-MCNC: 2800 NG/ML
AMPHET/CREAT UR: 6087 NG/MG {CREAT}
METHAMPHET UR CFM-MCNC: ABNORMAL NG/ML
METHAMPHET/CREAT UR: ABNORMAL

## 2025-07-15 PROCEDURE — H2035 A/D TX PROGRAM, PER HOUR: HCPCS | Performed by: MARRIAGE & FAMILY THERAPIST

## 2025-07-15 PROCEDURE — H2035 A/D TX PROGRAM, PER HOUR: HCPCS | Mod: HQ

## 2025-07-15 PROCEDURE — 1002N00001 HC LODGING PLUS FACILITY CHARGE ADULT

## 2025-07-15 NOTE — GROUP NOTE
Group Therapy Documentation    PATIENT'S NAME: Corinna J Schoen  MRN:   [id number]  :   1980  ACCT. NUMBER: [id number]  DATE OF SERVICE: 7/15/25  START TIME: 12:30 PM  END TIME:  2:30 PM  FACILITATOR(S): She Syed LADC; Kaylynn Lyle  TOPIC: BEH Group Therapy  Number of patients attending the group:  4  Group Length:  2 Hours    Dimensions addressed: 4, 5, and 6    Summary of Group / Topics Discussed:    Spiritual Care     Spiritual Group Therapy consisted of Spiritual Care and addressing Principles that are important in recovery, and wellness of self. Patients and facilitators ([place] & [facility])  reviewed topics related to sense of self, identity, and relations to others within spirituality/relision/nonspiritual concepts.      Group Attendance:  Attended group session and Excused from group session for therapy.    Patient's response to the group topic/interactions:  cooperative with task    Patient appeared to be Actively participating.        Client specific details:  Sonja Attended spiritually focused small group therapy. Patient remained engaged, participated, and explored personal insights of spirituality in their recovery. No other concerns reported.

## 2025-07-15 NOTE — GROUP NOTE
Group Therapy Documentation    PATIENT'S NAME: Corinna J Schoen  MRN:   [id number]  :   1980  ACCT. NUMBER: [id number]  DATE OF SERVICE: 7/15/25  START TIME:  3:00 PM  END TIME:  4:00 PM  FACILITATOR(S): Loida Hernadez LADC; Kiya Rodriguez LADC; Jose Silva LADC  TOPIC: BEH Group Therapy  Number of patients attending the group:  21  Group Length:  1 Hours    Dimensions addressed: 3, 4, 5, and 6    Summary of Group / Topics Discussed:    Recovery Principles and Sober coping skills    Group Attendance:  Attended group session    Patient's response to the group topic/interactions:  cooperative with task    Patient appeared to be Attentive and Engaged.        Client specific details: Sonja was an active participant in skills group presented by [employer].

## 2025-07-15 NOTE — PROGRESS NOTES
Acknowledgement of Current Treatment Plan - Initial Treatment Plan           INITIAL TREATMENT PLAN:   Corinna J Schoen  attests they have participated in the creation of this treatment plan/safety plan. Patient has been given a copy of this treatment plan/safety plan and is in agreement with the objectives, interventions, and understanding of the purpose of this treatment plan.     Patient reports her last use      .       Patient Signature:          Date:     Corinna J Schoen 1980        [facility]  Signature:                 Date:     She HAGAN, [facility]

## 2025-07-15 NOTE — GROUP NOTE
"Group Therapy Documentation    PATIENT'S NAME: Corinna J Schoen  MRN:   [id number]  :   1980  ACCT. NUMBER: [id number]  DATE OF SERVICE: 7/15/25  START TIME:  9:00 AM  END TIME: 11:00 AM  FACILITATOR(S): Jenni Denny LADC  TOPIC: BEH Group Therapy  Number of patients attending the group:  4  Group Length:  2 Hours    Dimensions addressed: 3, 4, 5, and 6    Summary of Group / Topics Discussed:    Recovery Principles, Sober coping skills, Relationship/socialization, Balanced lifestyle, Mindfulness/Relaxation, Disease of addiction, Emotions/expression, and Self-care activities  Daily check-in with feelings and green sheets; daily reflection reading and discussion; assignment presentation and processing; feedbacks. Discussion base on assignments -stress response and step one.  Serenity prayer.    Group Attendance:  Attended group session    Patient's response to the group topic/interactions:  cooperative with task    Patient appeared to be Passively engaged.        Client specific details:  Sonja shared feeling \"tired\". [place] was minimally participative. Sonja answered when prompted. Sonja remained respectful throughout session.  Suicide ideation: 0.         7/15/2025    11:00 AM   Suicide Ideation Check In   Since last session, how often have you had suicidal thoughts? No thoughts of suicide         "

## 2025-07-16 ENCOUNTER — TELEPHONE (OUTPATIENT)
Dept: GASTROENTEROLOGY | Facility: CLINIC | Age: 45
End: 2025-07-16
Payer: COMMERCIAL

## 2025-07-16 ENCOUNTER — HOSPITAL ENCOUNTER (OUTPATIENT)
Dept: BEHAVIORAL HEALTH | Facility: CLINIC | Age: 45
Discharge: HOME | End: 2025-07-16
Attending: FAMILY MEDICINE
Payer: COMMERCIAL

## 2025-07-16 DIAGNOSIS — Z12.11 COLON CANCER SCREENING: Primary | ICD-10-CM

## 2025-07-16 PROCEDURE — 1002N00001 HC LODGING PLUS FACILITY CHARGE ADULT

## 2025-07-16 PROCEDURE — H2035 A/D TX PROGRAM, PER HOUR: HCPCS | Mod: HQ

## 2025-07-16 RX ORDER — BISACODYL 5 MG/1
TABLET, DELAYED RELEASE ORAL
Qty: 4 TABLET | Refills: 0 | Status: SHIPPED | OUTPATIENT
Start: 2025-07-16

## 2025-07-16 NOTE — GROUP NOTE
"Group Therapy Documentation    PATIENT'S NAME: Corinna J Schoen  MRN:   [id number]  :   1980  ACCT. NUMBER: [id number]  DATE OF SERVICE: 25  START TIME:  9:45 AM  END TIME: 11:30 AM  FACILITATOR(S): Jenni Denny [facility]; Brenda Cunningham LMFT  TOPIC: BEH Group Therapy  Number of patients attending the group:  5  Group Length:  2 Hours    Dimensions addressed: 3, 4, 5, and 6    Summary of Group / Topics Discussed:     Recovery Principles, Co-occurring Illness Education and Symptom management, Emotions/expression, Relapse Prevention.     Patients completed daily check ins and the question of the day  DBT skill: wise mind distracts with ACCEPTS.  Patients engaged in reading and processing daily meditations.    Facilitator: KAMILLE Mondragon   Understanding Depression. What is it? Why does it happen? from Dr. Anupam Ayala,  The Chemistry of Ana  Depression and Women ([place])  Coping with depression, Dr. Anupam Ayala,  The Chemistry of Ana   Social Support      Group Attendance:  Attended group session    Patient's response to the group topic/interactions:  cooperative with task    Patient appeared to be Engaged.        Client specific details:  Sonja appeared emotional, she shared feeling \"all feelings\" and mentioned this is the longest she has ever been sober. Sonja shared feedbacks when prompted.  Suicide ideation: 0.         2025    10:00 AM   Suicide Ideation Check In   Since last session, how often have you had suicidal thoughts? No thoughts of suicide         "

## 2025-07-16 NOTE — GROUP NOTE
Psychoeducation Group Documentation    PATIENT'S NAME: Corinna J Schoen  MRN:   [id number]  :   1980  ACCT. NUMBER: [id number]  DATE OF SERVICE: 25  START TIME:  8:30 AM  END TIME:  9:30 AM  FACILITATOR(S): Mike Abbott LADC; She Syed LADC  TOPIC: BEH Pyschoeducation  Number of patients attending the group:  5  Group Length:  1 Hours    Skills Group Therapy Type: Recovery skills and Emotion regulation skills    Summary of Group / Topics Discussed:    Balanced lifestyle skills and Symptom management skills        Group Attendance:  Attended group session    Patient's response to the group topic/interactions:  cooperative with task    Patient appeared to be Attentive and Engaged.         Client specific details:  The patient participated in the morning lecture on Negative Emotions and Addiction.

## 2025-07-16 NOTE — PROGRESS NOTES
Name: Corinna J Schoen  Date: 7/15/2025  Medical Record: [id number]  Envelope Number: [id number]  List of Contents (List each item separately in new row):     Clonidine 0.1 mg tab  Lamotrigine 25 mg tab    Admission:  I am responsible for any personal items that are not sent to the safe or pharmacy.  [facility] is not responsible for loss, theft or damage of any property in my possession.      Patient Signature:  ___________________________________________       Date/Time:__________________________    Staff Signature: __________________________________       Date/Time:__________________________    [facility] Staff person, if patient is unable/unwilling to sign:      __________________________________________________________       Date/Time: __________________________    **All medications are packaged by [facility] staff and securely stored on [employer] [facility]. Medications left by patients at discharge will be packaged by LP staff and transported by [facility] staff to inpatient pharmacy for storage.**    Discharge:  [facility] has returned all of my personal belongings:    Patient Signature: ________________________________________     Date/Time: ____________________________________    Staff Signature: ______________________________________     Date/Time:_____________________________________

## 2025-07-16 NOTE — TELEPHONE ENCOUNTER
Pre visit planning completed.      Procedure details:    Patient scheduled for Colonoscopy on 7.31.25.     Arrival time: 1330. Procedure time 1430    Facility location: [facility]; [address] [place] [address], [address] Floor, [place] [address]. Check in location: 5th Floor.    Sedation type: Conscious sedation     Pre op exam needed? No.    Indication for procedure: screening      Chart review:     Electronic implanted devices? No    Recent diagnosis of diverticulitis within the last 6 weeks? No      Medication review:    Diabetic? No    Anticoagulants? No    Weight loss medication/injectable? No GLP-1 medication per patient's medication list. Nursing to verify with pre-assessment call.    Other medication HOLDING recommendations:  N/A      Prep for procedure:     Bowel prep recommendation: Extended Golytely. Bowel prep sent to [facility] - [place], [place] - [address] [facility].  Due to: BMI > 40    Procedure information and instructions sent via iwona Velez RN  Endoscopy Procedure Pre Assessment   [phone] option 3

## 2025-07-16 NOTE — TELEPHONE ENCOUNTER
Attempted to contact patient in order to complete pre assessment questions.     No answer. Left message to return call to [phone] option 3    Callback communication sent via [employer].      Karlie Mercer RN  Endoscopy Procedure Pre Assessment

## 2025-07-16 NOTE — GROUP NOTE
"Group Therapy Documentation    PATIENT'S NAME: Corinna J Schoen  MRN:   [id number]  :   1980  ACCT. NUMBER: [id number]  DATE OF SERVICE: 25  START TIME: 12:30 PM  END TIME:  2:30 PM  FACILITATOR(S): She Syed LADC  TOPIC: BEH Group Therapy  Number of patients attending the group:  5  Group Length:  2 Hours    Dimensions addressed: 4, 5, and 6    Summary of Group / Topics Discussed:    Recovery Principles, Sober coping skills, Emotions/expression, Relapse prevention, and Self-care activities    Group Objectives:   -Daily Check Ins   -Question of the Day  -Read Daily Meditations and discussion of related themes.   -Present Treatment Plan Assignments  - Group Process Time    Patients engaged in a art/recovery related activity.   \"Pull When Jars\"  Patients identified feelings that often struggle with and need to have a way to self sooth.   Each feeling identified was paired with 4 slips, and on each slip there are worlds of encouragement, motivation, and a coping skill for that particular emotion.   The activity pushes patients to identify their feelings and pair coping skills, and prevention tools to self soothe.                   Group Attendance:  Attended group session    Patient's response to the group topic/interactions:  cooperative with task    Patient appeared to be Actively participating.        Client specific details:  Sonja Attended small group therapy. Patient engaged in discussion and participated in sharing feedback to their peers.       "

## 2025-07-16 NOTE — TELEPHONE ENCOUNTER
Bowel Prep Review:  Disclaimer: No call was made to the patient.     Extended Golytely bowel prep. Bowel prep sent to    [facility] - [place], [place] - [address] [facility]  Recommended due to BMI > 40, constipation noted or reported, and diabetes.   Instructions were sent via iwona Hernadez LPN  Endoscopy Procedure Pre Assessment

## 2025-07-17 ENCOUNTER — HOSPITAL ENCOUNTER (OUTPATIENT)
Dept: BEHAVIORAL HEALTH | Facility: CLINIC | Age: 45
Discharge: HOME | End: 2025-07-17
Attending: FAMILY MEDICINE
Payer: COMMERCIAL

## 2025-07-17 ENCOUNTER — APPOINTMENT (OUTPATIENT)
Dept: GENERAL RADIOLOGY | Facility: CLINIC | Age: 45
End: 2025-07-17
Attending: STUDENT IN AN ORGANIZED HEALTH CARE EDUCATION/TRAINING PROGRAM
Payer: COMMERCIAL

## 2025-07-17 ENCOUNTER — HOSPITAL ENCOUNTER (EMERGENCY)
Facility: CLINIC | Age: 45
Discharge: HOME | End: 2025-07-17
Attending: STUDENT IN AN ORGANIZED HEALTH CARE EDUCATION/TRAINING PROGRAM
Payer: COMMERCIAL

## 2025-07-17 VITALS
OXYGEN SATURATION: 97 % | BODY MASS INDEX: 42.93 KG/M2 | RESPIRATION RATE: 16 BRPM | TEMPERATURE: 98.4 F | HEART RATE: 97 BPM | SYSTOLIC BLOOD PRESSURE: 152 MMHG | DIASTOLIC BLOOD PRESSURE: 93 MMHG | WEIGHT: 266 LBS

## 2025-07-17 DIAGNOSIS — L02.91 ABSCESS: ICD-10-CM

## 2025-07-17 DIAGNOSIS — M79.674 PAIN OF RIGHT GREAT TOE: ICD-10-CM

## 2025-07-17 PROCEDURE — 10060 I&D ABSCESS SIMPLE/SINGLE: CPT | Performed by: STUDENT IN AN ORGANIZED HEALTH CARE EDUCATION/TRAINING PROGRAM

## 2025-07-17 PROCEDURE — 76882 US LMTD JT/FCL EVL NVASC XTR: CPT | Mod: RT | Performed by: STUDENT IN AN ORGANIZED HEALTH CARE EDUCATION/TRAINING PROGRAM

## 2025-07-17 PROCEDURE — 250N000013 HC RX MED GY IP 250 OP 250 PS 637: Performed by: STUDENT IN AN ORGANIZED HEALTH CARE EDUCATION/TRAINING PROGRAM

## 2025-07-17 PROCEDURE — H2035 A/D TX PROGRAM, PER HOUR: HCPCS | Mod: HQ

## 2025-07-17 PROCEDURE — 99284 EMERGENCY DEPT VISIT MOD MDM: CPT | Mod: 25 | Performed by: STUDENT IN AN ORGANIZED HEALTH CARE EDUCATION/TRAINING PROGRAM

## 2025-07-17 PROCEDURE — 99284 EMERGENCY DEPT VISIT MOD MDM: CPT | Performed by: STUDENT IN AN ORGANIZED HEALTH CARE EDUCATION/TRAINING PROGRAM

## 2025-07-17 PROCEDURE — 76882 US LMTD JT/FCL EVL NVASC XTR: CPT | Mod: 26 | Performed by: STUDENT IN AN ORGANIZED HEALTH CARE EDUCATION/TRAINING PROGRAM

## 2025-07-17 PROCEDURE — 12001 RPR S/N/AX/GEN/TRNK 2.5CM/<: CPT | Performed by: STUDENT IN AN ORGANIZED HEALTH CARE EDUCATION/TRAINING PROGRAM

## 2025-07-17 PROCEDURE — 250N000009 HC RX 250: Performed by: STUDENT IN AN ORGANIZED HEALTH CARE EDUCATION/TRAINING PROGRAM

## 2025-07-17 PROCEDURE — 73630 X-RAY EXAM OF FOOT: CPT | Mod: RT

## 2025-07-17 RX ORDER — LIDOCAINE HYDROCHLORIDE 10 MG/ML
10 INJECTION, SOLUTION INFILTRATION; PERINEURAL ONCE
Status: COMPLETED | OUTPATIENT
Start: 2025-07-17 | End: 2025-07-17

## 2025-07-17 RX ORDER — CEPHALEXIN 500 MG/1
500 CAPSULE ORAL ONCE
Status: COMPLETED | OUTPATIENT
Start: 2025-07-17 | End: 2025-07-17

## 2025-07-17 RX ORDER — CEPHALEXIN 500 MG/1
500 CAPSULE ORAL 4 TIMES DAILY
Qty: 20 CAPSULE | Refills: 0 | Status: SHIPPED | OUTPATIENT
Start: 2025-07-17 | End: 2025-07-24

## 2025-07-17 RX ORDER — SULFAMETHOXAZOLE AND TRIMETHOPRIM 800; 160 MG/1; MG/1
1 TABLET ORAL 2 TIMES DAILY
Qty: 20 TABLET | Refills: 0 | Status: SHIPPED | OUTPATIENT
Start: 2025-07-17

## 2025-07-17 RX ADMIN — LIDOCAINE HYDROCHLORIDE 10 ML: 10 INJECTION, SOLUTION INFILTRATION; PERINEURAL at 21:15

## 2025-07-17 RX ADMIN — CEPHALEXIN 500 MG: 500 CAPSULE ORAL at 20:53

## 2025-07-17 ASSESSMENT — ACTIVITIES OF DAILY LIVING (ADL)
ADLS_ACUITY_SCORE: 41

## 2025-07-17 ASSESSMENT — COLUMBIA-SUICIDE SEVERITY RATING SCALE - C-SSRS
5. HAVE YOU STARTED TO WORK OUT OR WORKED OUT THE DETAILS OF HOW TO KILL YOURSELF? DO YOU INTEND TO CARRY OUT THIS PLAN?: NO
1. IN THE PAST MONTH, HAVE YOU WISHED YOU WERE DEAD OR WISHED YOU COULD GO TO SLEEP AND NOT WAKE UP?: YES
6. HAVE YOU EVER DONE ANYTHING, STARTED TO DO ANYTHING, OR PREPARED TO DO ANYTHING TO END YOUR LIFE?: YES
2. HAVE YOU ACTUALLY HAD ANY THOUGHTS OF KILLING YOURSELF IN THE PAST MONTH?: YES
3. HAVE YOU BEEN THINKING ABOUT HOW YOU MIGHT KILL YOURSELF?: YES
4. HAVE YOU HAD THESE THOUGHTS AND HAD SOME INTENTION OF ACTING ON THEM?: NO

## 2025-07-17 NOTE — GROUP NOTE
"Group Therapy Documentation    PATIENT'S NAME: Corinna J Schoen  MRN:   [id number]  :   1980  ACCT. NUMBER: [id number]  DATE OF SERVICE: 25  START TIME:  9:00 AM  END TIME: 11:00 AM  FACILITATOR(S): Jenni Denny LADC  TOPIC: BEH Group Therapy  Number of patients attending the group:  3  Group Length:  2 Hours    Dimensions addressed: 3, 4, 5, and 6    Summary of Group / Topics Discussed:    Recovery Principles, Relationship/socialization, Mindfulness/Relaxation, Coping/DBT informed care, Emotions/expression, Leisure explorations/use of leisure time, and Self-care activities  Daily check in with questions, daily reflection reading and sharing, DBT- ACCEPTS; sharing and discussion, integrated \"Activities\" to painting.  Serenity prayer.    Group Attendance:  Attended group session    Patient's response to the group topic/interactions:  cooperative with task    Patient appeared to be Engaged.        Client specific details:  Sonja shared feeling neutral. Sonja was asked to stand-up if she was getting sleepy, redirected and followed. Sonja was able to be more awake during the latter part of group and participated in the activity.   Suicide ideation: 0.         2025    11:00 AM   Suicide Ideation Check In   Since last session, how often have you had suicidal thoughts? No thoughts of suicide         "

## 2025-07-17 NOTE — GROUP NOTE
"Group Therapy Documentation    PATIENT'S NAME: Corinna J Schoen  MRN:   [id number]  :   1980  ACCT. NUMBER: [id number]  DATE OF SERVICE: 25  START TIME: 12:30 PM  END TIME:  2:30 PM  FACILITATOR(S): She Syed LADC  TOPIC: BEH Group Therapy  Number of patients attending the group:  3  Group Length:  2 Hours    Dimensions addressed: 3, 4, and 5    Summary of Group / Topics Discussed:    Relationship/socialization, Balanced lifestyle, Co-occurring illnesses symptom management, Disease of addiction, Emotions/expression, Leisure explorations/use of leisure time, and Relapse prevention    Patients viewed an addiction/mental health based documentary. \" [place]\" This film addressed: addiction as a disease, relationships and addiction, engagement in AA, and evaluating priorities when getting sober.   Patients engaged in a thorough discussion about the film, and shared personal experiences, and how the film helped them process their own life events. ,    Group Attendance:  Attended group session    Patient's response to the group topic/interactions:  cooperative with task    Patient appeared to be Actively participating.        Client specific details:  Sonja Attended small group therapy. Patient engaged in discussion and participated in sharing feedback to their peers.       "

## 2025-07-17 NOTE — GROUP NOTE
Group Therapy Documentation    PATIENT'S NAME: Corinna J Schoen  MRN:   [id number]  :   1980  ACCT. NUMBER: [id number]  DATE OF SERVICE: 25  START TIME:  3:00 PM  END TIME:  4:00 PM  FACILITATOR(S): Anabell Son LADC; Yoly Linares LADC  TOPIC: BEH Group Therapy  Number of patients attending the group:  16  Group Length:  1 Hours    Dimensions addressed: 2    Summary of Group / Topics Discussed:    Balanced lifestyle and Medication management    Group Attendance:  Attended group session    Patient's response to the group topic/interactions:  cooperative with task    Patient appeared to be Attentive and Engaged.        Client specific details: Pt listened respectfully to Dr. Casiano's presentation on addiction and mood disorders and asked appropriate questions.

## 2025-07-17 NOTE — ED TRIAGE NOTES
Pt has blisters on her right great toe. Pt states that she has been trying to care for the wound. Pt is concerned for a potential bone infection.      Triage Assessment (Adult)       Row Name 07/17/25 1030          Triage Assessment    Airway WDL WDL        Respiratory WDL    Respiratory WDL WDL        Skin Circulation/Temperature WDL    Skin Circulation/Temperature WDL WDL        Cardiac WDL    Cardiac WDL WDL        Peripheral/Neurovascular WDL    Peripheral Neurovascular WDL WDL        Cognitive/Neuro/Behavioral WDL    Cognitive/Neuro/Behavioral WDL all     Level of Consciousness alert     Arousal Level opens eyes spontaneously     Orientation oriented x 4     Speech spontaneous;clear     Mood/Behavior anxious        Pupils (CN II)    Pupil PERRLA yes     Pupil Size Left 3 mm     Pupil Size Right 3 mm        Motor Response    Motor Response general motor response

## 2025-07-17 NOTE — PROGRESS NOTES
Pt came to writer with concerns of pain on right foot (see below).  Due to pt tenderness around these areas, complaints of pain to the touch and pinkish / redness of surrounding tissue, pt sent to ED for assessment.  Will pass on to LPRN nurses to review plan of care when they return tomorrow morning on 7/18/25.  Pt presented to ED at 0650pm  LP Lead Coordinator Silas De La Cruz RN  [facility] [phone]    1) Open painful blister near metatarsophalangeal joint of great toe          2) distal phalanx of right foot appears to have a callous - pt reports is painful

## 2025-07-17 NOTE — PROGRESS NOTES
Patient informed writer , she completed her phone interview with [facility] and they accepted her admission referral and put her on the list to admit for 8/8/25.

## 2025-07-18 ENCOUNTER — HOSPITAL ENCOUNTER (OUTPATIENT)
Dept: BEHAVIORAL HEALTH | Facility: CLINIC | Age: 45
Discharge: HOME | End: 2025-07-18
Attending: FAMILY MEDICINE
Payer: COMMERCIAL

## 2025-07-18 PROCEDURE — 1002N00001 HC LODGING PLUS FACILITY CHARGE ADULT

## 2025-07-18 PROCEDURE — H2035 A/D TX PROGRAM, PER HOUR: HCPCS | Mod: HQ

## 2025-07-18 NOTE — DISCHARGE INSTRUCTIONS
We are discharging you home with antibiotics for your infection and abscess  If you develop any new or worsening symptoms please return immediately to the emergency room including but not limited to pain, swelling, worsening pus, fevers or chills  We have also set you up with a primary care visit should be giving you a call in the next few days

## 2025-07-18 NOTE — GROUP NOTE
"Group Therapy Documentation    PATIENT'S NAME: Corinna J Schoen  MRN:   [id number]  :   1980  ACCT. NUMBER: [id number]  DATE OF SERVICE: 25  START TIME:  9:00 AM  END TIME: 11:00 AM  FACILITATOR(S): Mike Abbott LADC  TOPIC: BEH Group Therapy  Number of patients attending the group:  4  Group Length:  2 Hours    Dimensions addressed: 3, 4, and 5    Summary of Group / Topics Discussed:    Recovery Principles, Balanced lifestyle, Cognitive behavioral therapy skills, Co-occurring illnesses symptom management, Coping/DBT informed care, Trauma informed care, Disease of addiction, Emotions/expression, Medication management, and Relapse prevention    Group Attendance:  Attended group session    Patient's response to the group topic/interactions:  cooperative with task    Patient appeared to be Attentive and Engaged.        Client specific details:  Sonja participated in morning group. She checked in and reported feeling \"pretty good, rested.\" This was followed by the reading and discussion on recovery being a process, and how sometimes we have to be patient for our lives to get better. For the remainder of the group, she took part in a discussion on the negative aspects of early recovery such as weight gain, lack of energy, and having trouble sleeping.    Suicide ideation: 0.         2025     1:00 PM   Suicide Ideation Check In   Since last session, how often have you had suicidal thoughts? No thoughts of suicide         "

## 2025-07-18 NOTE — PROGRESS NOTES
Pt approached writer and stated she had forgotten to tell her psychiatrist when she saw him earlier this week that she is having nightmares and leg shaking overnight. She requested getting some sort of as needed medication to take at night for this. CLINT called pt's psychiatric provider at [facility] and left a message detailing this and requesting a medication be sent. CLINT phone number was left with the message.

## 2025-07-18 NOTE — GROUP NOTE
Group Therapy Documentation    PATIENT'S NAME: Corinna J Schoen  MRN:   [id number]  :   1980  ACCT. NUMBER: [id number]  DATE OF SERVICE: 25  START TIME: 12:30 PM  END TIME:  2:30 PM  FACILITATOR(S): Dragan Cervantes LADC  TOPIC: BEH Group Therapy  Number of patients attending the group:  4  Group Length:  2 Hours    Dimensions addressed: 1, 2, 3, 4, 5, and 6    Summary of Group / Topics Discussed:    Self-care activities    Group Attendance:  Attended group session    Patient's response to the group topic/interactions:  cooperative with task    Patient appeared to be Actively participating.        Client specific details:  Sonja participated and interacted appropriately with peers and staff in PM group. No triggers to use noted or discussed.
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
## 2025-07-19 ENCOUNTER — HOSPITAL ENCOUNTER (OUTPATIENT)
Dept: BEHAVIORAL HEALTH | Facility: CLINIC | Age: 45
Discharge: HOME | End: 2025-07-19
Attending: FAMILY MEDICINE
Payer: COMMERCIAL

## 2025-07-19 PROCEDURE — H2035 A/D TX PROGRAM, PER HOUR: HCPCS | Mod: HQ | Performed by: COUNSELOR

## 2025-07-19 PROCEDURE — H2035 A/D TX PROGRAM, PER HOUR: HCPCS | Mod: HQ

## 2025-07-19 PROCEDURE — 1002N00001 HC LODGING PLUS FACILITY CHARGE ADULT

## 2025-07-19 NOTE — GROUP NOTE
Psychoeducation Group Documentation    PATIENT'S NAME: Corinna J Schoen  MRN:   [id number]  :   1980  ACCT. NUMBER: [id number]  DATE OF SERVICE: 25  START TIME: 12:30 PM  END TIME:  2:30 PM  FACILITATOR(S): Yoly Linares LADC; Kiya Rodriguez LADC  TOPIC: BEH Pyschoeducation  Number of patients attending the group:  16  Group Length:  2 Hours    Skills Group Therapy Type: Recovery skills, Daily living/independence skills, and Healthy behaviors development    Summary of Group / Topics Discussed:    Balanced lifestyle skills and Relapse prevention skills    Patients received lectures regarding cognitive messages from the lens of addiction and from the lens of recovery.  Patients also received a lecture regarding the goals that are possible with consistent and persistent choices toward a better life.        Group Attendance:  Attended group session    Patient's response to the group topic/interactions:  cooperative with task and listened actively    Patient appeared to be Actively participating and Engaged.         Client specific details:  Patient appeared actively engaged in today's group.

## 2025-07-19 NOTE — GROUP NOTE
Group Therapy Documentation    PATIENT'S NAME: Corinna J Schoen  MRN:   [id number]  :   1980  ACCT. NUMBER: [id number]  DATE OF SERVICE: 25  START TIME: 10:00 AM  END TIME: 11:15 AM  FACILITATOR(S): Kiya Rodriguez LADC  TOPIC: BEH Group Therapy  Number of patients attending the group:  5    Group Length:  1.5 Hours    Dimensions addressed: 3, 4, and 5    Summary of Group / Topics Discussed:    Recovery Principles, Sober coping skills, and Relapse prevention    Group Attendance:  Attended group session    Patient's response to the group topic/interactions:  cooperative with task    Patient appeared to be Actively participating, Attentive, and Engaged.        Client specific details:  Patient attended conflict management lecture and was attentive and participative    Suicide ideation: 0.         2025    10:00 AM   Suicide Ideation Check In   Since last session, how often have you had suicidal thoughts? No thoughts of suicide     .

## 2025-07-20 ENCOUNTER — HOSPITAL ENCOUNTER (OUTPATIENT)
Dept: BEHAVIORAL HEALTH | Facility: CLINIC | Age: 45
Discharge: HOME | End: 2025-07-20
Attending: FAMILY MEDICINE
Payer: COMMERCIAL

## 2025-07-20 PROCEDURE — H2035 A/D TX PROGRAM, PER HOUR: HCPCS | Mod: HQ

## 2025-07-20 PROCEDURE — 1002N00001 HC LODGING PLUS FACILITY CHARGE ADULT

## 2025-07-20 PROCEDURE — H2035 A/D TX PROGRAM, PER HOUR: HCPCS | Mod: HQ | Performed by: COUNSELOR

## 2025-07-20 NOTE — GROUP NOTE
Group Therapy Documentation    PATIENT'S NAME: Corinna J Schoen  MRN:   [id number]  :   1980  ACCT. NUMBER: [id number]  DATE OF SERVICE: 25  START TIME: 12:30 PM  END TIME:  1:30 PM  FACILITATOR(S): Kiya Rodriguez LADC  TOPIC: BEH Group Therapy  Number of patients attending the group:  15    Group Length:  1 Hour    Dimensions addressed: 3, 4, and 5    Summary of Group / Topics Discussed:    Emotions/expression and Relapse prevention    Group Attendance:  Attended group session    Patient's response to the group topic/interactions:  cooperative with task    Patient appeared to be Actively participating, Attentive, and Engaged.        Client specific details:  Patient attended emotion management lecture and was attentive and participative.

## 2025-07-20 NOTE — GROUP NOTE
Psychoeducation Group Documentation    PATIENT'S NAME: Corinna J Schoen  MRN:   [id number]  :   1980  ACCT. NUMBER: [id number]  DATE OF SERVICE: 25  START TIME:  8:45 AM  END TIME: 10:15 AM  FACILITATOR(S): Yoly Linares LADC; Gilda Earl RN; Kiya Rodriguez LADC  TOPIC: BEH Pyschoeducation  Number of patients attending the group:  15  Group Length:  1.5 Hours    Skills Group Therapy Type: Daily living/independence skills, Healthy behaviors development, and Medication education    Summary of Group / Topics Discussed:    Balanced lifestyle skills and Medication management skills    Unit nurse provided a lecture on sleep, nutrition, self-care, and marcelo when pregnant.      Group Attendance:  Attended group session     Patient's response to the group topic/interactions:  cooperative with task and listened actively     Patient appeared to be Attentive and Engaged.          Client specific details:  Patient appeared actively engaged in today's lecture.     Suicide ideation: 0.           2025    10:00 AM   Suicide Ideation Check In   Since last session, how often have you had suicidal thoughts? No thoughts of suicide

## 2025-07-21 ENCOUNTER — HOSPITAL ENCOUNTER (OUTPATIENT)
Dept: BEHAVIORAL HEALTH | Facility: CLINIC | Age: 45
Discharge: HOME | End: 2025-07-21
Attending: FAMILY MEDICINE
Payer: COMMERCIAL

## 2025-07-21 DIAGNOSIS — M17.12 PRIMARY OSTEOARTHRITIS OF LEFT KNEE: ICD-10-CM

## 2025-07-21 PROCEDURE — H2035 A/D TX PROGRAM, PER HOUR: HCPCS | Mod: HQ

## 2025-07-21 PROCEDURE — 1002N00001 HC LODGING PLUS FACILITY CHARGE ADULT

## 2025-07-21 RX ORDER — NAPROXEN 500 MG/1
500 TABLET ORAL 2 TIMES DAILY WITH MEALS
Qty: 20 TABLET | Refills: 0 | Status: SHIPPED | OUTPATIENT
Start: 2025-07-21

## 2025-07-21 NOTE — GROUP NOTE
Group Therapy Documentation    PATIENT'S NAME: Corinna J Schoen  MRN:   [id number]  :   1980  ACCT. NUMBER: [id number]  DATE OF SERVICE: 25  START TIME:  9:00 AM  END TIME: 11:00 AM  FACILITATOR(S): Jenni Denny LADC  TOPIC: BEH Group Therapy  Number of patients attending the group:  4  Group Length:  2 Hours    Dimensions addressed: 3, 4, 5, and 6    Summary of Group / Topics Discussed:    Recovery Principles, Relationship/socialization, Co-occurring illnesses symptom management, Coping/DBT informed care, Disease of addiction, Emotions/expression, and Relapse prevention  Daily check-in with questions, daily reflection reading and feedbacks, discussion on different types of automatic negative thoughts and how to confront them. Serenity prayer.    Group Attendance:  Attended group session    Patient's response to the group topic/interactions:  cooperative with task    Patient appeared to be Engaged.        Client specific details:  Sonja shared feeling strong. Sonja listened attentively and shared appropriate feedbacks.   Suicide ideation: 0.         2025    10:00 AM   Suicide Ideation Check In   Since last session, how often have you had suicidal thoughts? No thoughts of suicide

## 2025-07-21 NOTE — PROGRESS NOTES
Patient presented to session reporting she felt triggered and frustrated following an interaction in group. She described the situation as activating her sense of territoriality, stating that she felt the need to remove herself from the environment. Patient was encouraged by the group counselor to speak with writer, and was able to process the incident and reflect on her emotional response. She identified the skills she used to manage the situation, including walking away to regulate. Writer provided psychoeducation on the STOP skill and noted that patient had effectively utilized it in the moment. Patient was receptive to feedback, appeared validated in her response and self-awareness, and thanked the counselors for their time.  Jenni Denny, [facility] on 7/21/2025 at 2:44 PM

## 2025-07-21 NOTE — GROUP NOTE
Group Therapy Documentation    PATIENT'S NAME: Corinna J Schoen  MRN:   [id number]  :   1980  ACCT. NUMBER: [id number]  DATE OF SERVICE: 25  START TIME: 12:30 PM  END TIME:  2:30 PM  FACILITATOR(S): She Syed LADC  TOPIC: BEH Group Therapy  Number of patients attending the group:  5  Group Length:  2 Hours    Dimensions addressed: 3    Summary of Group / Topics Discussed:    Recovery Principles    Group Objectives:   -Daily Check Ins   -Question of the Day  -Read Daily Meditations and discussion of related themes.   -Present Treatment Plan Assignments  - Group Process Time              Group Attendance:  Attended group session    Patient's response to the group topic/interactions:  cooperative with task    Patient appeared to be Actively participating.        Client specific details:  Sonja Attended small group therapy. Patient engaged in discussion and participated in sharing feedback to their peers.

## 2025-07-21 NOTE — TELEPHONE ENCOUNTER
20 0941   Provider Notification   Reason for Communication Evaluate   Provider Name [facility]   Provider Notification Physician   Method of Communication Secure Message   Response Waiting for response   Notification Time 60 920 06 98     6/10 back pain. Nothing ordered for pain. Takes prn tramadol for chronic back pain at home. also said she takes prn gabapentin, though med rec says it is . Can she have something for her pain?  BP is a little low Pt is currently at [facility] for treatment until 8/8/2025. Pt got some pain medication prescribed at an urgent care previous to admitting to  and is requesting her PCP refill these medications to 's pharmacy, [facility], so she can get them for her stay at .

## 2025-07-22 ENCOUNTER — TELEPHONE (OUTPATIENT)
Dept: BEHAVIORAL HEALTH | Facility: CLINIC | Age: 45
End: 2025-07-22
Payer: COMMERCIAL

## 2025-07-22 ENCOUNTER — HOSPITAL ENCOUNTER (OUTPATIENT)
Dept: BEHAVIORAL HEALTH | Facility: CLINIC | Age: 45
Discharge: HOME | End: 2025-07-22
Attending: FAMILY MEDICINE
Payer: COMMERCIAL

## 2025-07-22 PROCEDURE — H2035 A/D TX PROGRAM, PER HOUR: HCPCS | Mod: HQ

## 2025-07-22 PROCEDURE — 1002N00001 HC LODGING PLUS FACILITY CHARGE ADULT

## 2025-07-22 ASSESSMENT — ANXIETY QUESTIONNAIRES
5. BEING SO RESTLESS THAT IT IS HARD TO SIT STILL: SEVERAL DAYS
4. TROUBLE RELAXING: NEARLY EVERY DAY
7. FEELING AFRAID AS IF SOMETHING AWFUL MIGHT HAPPEN: NEARLY EVERY DAY
1. FEELING NERVOUS, ANXIOUS, OR ON EDGE: NEARLY EVERY DAY
2. NOT BEING ABLE TO STOP OR CONTROL WORRYING: SEVERAL DAYS
IF YOU CHECKED OFF ANY PROBLEMS ON THIS QUESTIONNAIRE, HOW DIFFICULT HAVE THESE PROBLEMS MADE IT FOR YOU TO DO YOUR WORK, TAKE CARE OF THINGS AT HOME, OR GET ALONG WITH OTHER PEOPLE: VERY DIFFICULT
GAD7 TOTAL SCORE: 14
6. BECOMING EASILY ANNOYED OR IRRITABLE: MORE THAN HALF THE DAYS
GAD7 TOTAL SCORE: 14
3. WORRYING TOO MUCH ABOUT DIFFERENT THINGS: SEVERAL DAYS

## 2025-07-22 ASSESSMENT — PATIENT HEALTH QUESTIONNAIRE - PHQ9: SUM OF ALL RESPONSES TO PHQ QUESTIONS 1-9: 18

## 2025-07-22 NOTE — GROUP NOTE
Group Therapy Documentation    PATIENT'S NAME: Corinna J Schoen  MRN:   [id number]  :   1980  ACCT. NUMBER: [id number]  DATE OF SERVICE: 25  START TIME:  9:00 AM  END TIME: 11:00 AM  FACILITATOR(S): Anabell Son LADC  TOPIC: BEH Group Therapy  Number of patients attending the group:  5  Group Length:  2 Hours    Dimensions addressed: 3 and 4    Summary of Group / Topics Discussed:    Sober coping skills and Emotions/expression      Group Attendance:  Attended group session    Patient's response to the group topic/interactions:  cooperative with task    Patient appeared to be Actively participating, Attentive, and Engaged.        Client specific details: Pt reported feeling angry, and declined to answer the question of the day. She took part in a group discussion of the risks and benefits of change and coping with emotional discomfort while sober.

## 2025-07-22 NOTE — GROUP NOTE
Group Therapy Documentation    PATIENT'S NAME: Corinna J Schoen  MRN:   [id number]  :   1980  ACCT. NUMBER: [id number]  DATE OF SERVICE: 25  START TIME:  3:00 PM  END TIME:  4:00 PM  FACILITATOR(S): Jose Silva LADC  TOPIC: BEH Group Therapy  Number of patients attending the group:  15  Group Length:  1 Hours    Dimensions addressed: 3, 4, 5, and 6    Summary of Group / Topics Discussed:    Relationship/socialization    Skills group was facilitated by counseling staff regarding the topic of Communication Styles. Participants engaged in discussion provided feedback throughout group lecture.     Group Attendance:  Attended group session    Patient's response to the group topic/interactions:  cooperative with task    Patient appeared to be Actively participating.        Client specific details:  Patient actively engaged in group discussion.

## 2025-07-22 NOTE — TELEPHONE ENCOUNTER
[facility] Psychiatry Clinic Appointment Request   Urgent please call [phone] or [phone] if there are any openings on 7/23    Type of Request: Psychiatry / Addiction Medicine at:  [facility] Psychiatry, [facility], 2nd floor [address]  [address]  [address]    [phone]    [facility] nurse contact phone number: [phone]    Pt is currently attending [facility] residential treatment and requesting long term psychiatric/addiction medicine stabilization/service. Pt has been seeing a provider at [facility], but she has only seen her a couple times and per pt and writer, pt is needing a provider that can manage a more complex set of diagnoses. Pt has Schizoaffective disorder, bipolar type. She reports she is struggling with increasing agitation, irritability, reports some paranoia and auditory hallucinations (currently they are not command hallucinations, but she has had those in the past). Pt is concerned if she does not have her medications adjusted she will not be able to complete treatment. Pt wants to transfer her care to [facility] psych     Diagnosis: Stimulant Use Disorder    MH Diagnosis: Schizoaffective disorder, bipolar type    What Would Be Helpful from the [facility] Psychiatry Clinic: acute psychiatric medication stabilization to improve treatment participation. See above     Needs: NO    Requesting in-person appt.  Pt discharge date from [facility] is: 8/8    If pt is seen after discharge from [facility], confirm if NEW Patient Packet should be sent to email or mailing address of patient choice:    Confirm Email address      Email   [email or web address]          Confirm mailing address  unknown    Clinician Gender Preference (if applicable): NANI    [facility]  Nurse Liaison / CD Adult [facility] ([facility])  [address]  O:[phone]  F:[phone]  RN [place] [id number]

## 2025-07-22 NOTE — PROGRESS NOTES
Pt met with her [facility] psychiatric provider but was very frustrated with the visit. She is worried the provider is not able to address her current mental health needs. She came to me with concerns if her meds are not adjusted, she is worried she might revert to past behavior (violence and self-injury) She reports she does not does want to harm herself or others but she is concerned her symptoms are getting worse and could led to harming herself or others if her mental health is not adequately addressed. She reports she is struggling with increasing agitation, irritability, reports paranoia and auditory hallucinations (currently they are not command hallucinations, but she has had those in the past). Pt wants to transfer her care to [facility] psych, and writer has requested an urgent appt.     Writer also notified , medical director and primary counselors of the current situation. Pt verbalized understanding that she would immediatly alert staff with any SI or other mental health concerns. [facility] staff notified of pt's current mental health symptoms.

## 2025-07-22 NOTE — GROUP NOTE
Group Therapy Documentation    PATIENT'S NAME: Corinna J Schoen  MRN:   [id number]  :   1980  ACCT. NUMBER: [id number]  DATE OF SERVICE: 25  START TIME: 12:00 PM  END TIME:  2:30 PM  FACILITATOR(S): She Syed LADC; Kaylynn Lyle  TOPIC: BEH Group Therapy  Number of patients attending the group:  6  Group Length:  2 Hours    Dimensions addressed: 4 and 5    Summary of Group / Topics Discussed:    Recovery Principles and Spiritual Care    Spiritual Group Therapy consisted of Spiritual Care and addressing Principles that are important in recovery, and wellness of self. Patients and facilitators (Twain & HELENC)  reviewed topics related to sense of self, identity, and relations to others within spirituality/relision/nonspiritual concepts.     Group Attendance:  Attended group session    Patient's response to the group topic/interactions:  cooperative with task    Patient appeared to be Actively participating.        Client specific details:  Sonja Attended small group therapy. Patient engaged in discussion and participated in sharing feedback to their peers.

## 2025-07-23 ENCOUNTER — TELEPHONE (OUTPATIENT)
Dept: BEHAVIORAL HEALTH | Facility: CLINIC | Age: 45
End: 2025-07-23
Payer: COMMERCIAL

## 2025-07-23 ENCOUNTER — HOSPITAL ENCOUNTER (OUTPATIENT)
Dept: BEHAVIORAL HEALTH | Facility: CLINIC | Age: 45
Discharge: HOME | End: 2025-07-23
Attending: FAMILY MEDICINE
Payer: COMMERCIAL

## 2025-07-23 DIAGNOSIS — F15.10 METHAMPHETAMINE USE (H): ICD-10-CM

## 2025-07-23 DIAGNOSIS — F19.99 SUBSTANCE-RELATED DISORDER (H): Primary | ICD-10-CM

## 2025-07-23 PROCEDURE — H2035 A/D TX PROGRAM, PER HOUR: HCPCS | Performed by: MARRIAGE & FAMILY THERAPIST

## 2025-07-23 PROCEDURE — H2035 A/D TX PROGRAM, PER HOUR: HCPCS | Mod: HQ

## 2025-07-23 PROCEDURE — 1002N00001 HC LODGING PLUS FACILITY CHARGE ADULT

## 2025-07-23 NOTE — TELEPHONE ENCOUNTER
Pt wants to transfer her care to internal med at [facility], previously seen in [facility]. Pt has multiple medical concerns and had a recent ED on 7/17 visit for an abscess on her foot. Can pt be scheduled for an ED follow up visit this week or early next week?    Please reply to this message or call [phone] to schedule pt does not have access to her phone right now.     Pt is in a residential program at [facility]-we do not have a medical provider.   [facility]  Nurse Liaison / CD [facility]  [address] [place] [address]  O:[phone]  F:[phone]  [place] [id number]

## 2025-07-23 NOTE — PROGRESS NOTES
Name: Corinna J Schoen  Date: 7/23/2025  Medical Record: [id number]    Envelope Number: [id number]    List of Contents (List each item separately in new row):   Clonidine HCL 0.1 mg tabs / Lamotrigine 25 mg tabs    Admission:  I am responsible for any personal items that are not sent to the safe or pharmacy.  [facility] is not responsible for loss, theft or damage of any property in my possession.      Patient Signature:  ___________________________________________       Date/Time:__________________________    Staff Signature: __________________________________       Date/Time:__________________________    [facility] Staff person, if patient is unable/unwilling to sign:      __________________________________________________________       Date/Time: __________________________    **All medications are packaged by [facility] staff and securely stored on [facility]. Medications left by patients at discharge will be packaged by [facility] staff and transported by [facility] staff to inpatient pharmacy for storage.**    Discharge:  [facility] has returned all of my personal belongings:    Patient Signature: ________________________________________     Date/Time: ____________________________________    Staff Signature: ______________________________________     Date/Time:_____________________________________

## 2025-07-23 NOTE — GROUP NOTE
Group Therapy Documentation    PATIENT'S NAME: Corinna J Schoen  MRN:   [id number]  :   1980  ACCT. NUMBER: [id number]  DATE OF SERVICE: 25  START TIME:  8:30 AM  END TIME:  9:30 AM  FACILITATOR(S): Loida Hernadez LADC; Mike Abbott LADC; Dragan Cervantes LADC  TOPIC: BEH Group Therapy  Number of patients attending the group:  16  Group Length:  1 Hours    Dimensions addressed: 3, 4, 5, and 6    Summary of Group / Topics Discussed:    Cognitive behavioral therapy skills    Group Attendance:  Attended group session    Patient's response to the group topic/interactions:  cooperative with task    Patient appeared to be Attentive and Engaged.        Client specific details: Rubina was an active participant in skills group on CBT.

## 2025-07-23 NOTE — PROGRESS NOTES
Writer met with pt to discuss psychiatry appt. Pt reports worsening auditory hallucinations, but has been working to not let this interfere during group. Pt was tearful about how hard she is working and how frustrated she is with her mental health symptoms. Writer said she would continue to try to get pt a sooner psychiatry appt (her appt is tentatively next wed). Pt reports she thinks she can wait until then but will let staff know immediately if she needs to go to ED for mental health evaluation. Writer validated pt's concerns and provided encouragement.

## 2025-07-23 NOTE — GROUP NOTE
"Group Therapy Documentation    PATIENT'S NAME: Corinna J Schoen  MRN:   [id number]  :   1980  ACCT. NUMBER: [id number]  DATE OF SERVICE: 25  START TIME:  9:45 AM  END TIME: 11:30 AM  FACILITATOR(S): Jenni Denny LADC; Brenda Cunningham LMFT  TOPIC: BEH Group Therapy  Number of patients attending the group:  6  Group Length:  2 Hours    Dimensions addressed: 3, 4, 5, and 6    Summary of Group / Topics Discussed:    Recovery Principles, Co-occurring Illness Education and Symptom management, Emotions/expression, Relapse Prevention.     Patients completed daily check ins and the question of the day  Discussion on self-soothing activities and their own examples.  Patients discussed life lessons, phrases, quotes that have been helpful in making changes or progress.   Patients engaged in reading and processing daily meditations.    Facilitator: KAMILLE Mondragon provided/reviewed handouts on  What is Trauma\",\"Little  t  trauma and Big  T  trauma\"\"Common Reactions to Trauma\",and \"Growing stronger from Trauma\"    Brenda and patients discussed \"Suviving and Growing From Trauma.\"   Patients were able to discuss and ask mental health related questions and get feedback.          Group Attendance:  Attended group session    Patient's response to the group topic/interactions:  cooperative with task    Patient appeared to be Engaged.        Client specific details:  Sonja returned from her individual therapy appointment. Sonja listened attentively and shared appropriate feedbacks.    Suicide ideation: 0.         2025    11:00 AM   Suicide Ideation Check In   Since last session, how often have you had suicidal thoughts? No thoughts of suicide         "

## 2025-07-23 NOTE — TELEPHONE ENCOUNTER
Can we go ahead and schedule that and if there is any option that pt can be seen before then just let [facility] nurses know. [phone], thank you for all your coordination.

## 2025-07-23 NOTE — GROUP NOTE
Group Therapy Documentation    PATIENT'S NAME: Corinna J Schoen  MRN:   [id number]  :   1980  ACCT. NUMBER: [id number]  DATE OF SERVICE: 25  START TIME: 12:30 PM  END TIME:  2:30 PM  FACILITATOR(S): She Syed LADC  TOPIC: BEH Group Therapy  Number of patients attending the group:  7  Group Length:  2 Hours    Dimensions addressed: 3, 4, and 5    Summary of Group / Topics Discussed:    Recovery Principles and Relationship/socialization    Group Objectives:   -Daily Check Ins   -Question of the Day  -Read Daily Meditations and discussion of related themes.   -Present Treatment Plan Assignments  - Group Process Time                  Group Attendance:  Attended group session    Patient's response to the group topic/interactions:  cooperative with task    Patient appeared to be Actively participating.        Client specific details:  Sonja Attended small group therapy. Patient engaged in discussion and participated in sharing feedback to their peers.

## 2025-07-24 ENCOUNTER — HOSPITAL ENCOUNTER (OUTPATIENT)
Dept: BEHAVIORAL HEALTH | Facility: CLINIC | Age: 45
Discharge: HOME | End: 2025-07-24
Attending: FAMILY MEDICINE
Payer: COMMERCIAL

## 2025-07-24 ENCOUNTER — TELEPHONE (OUTPATIENT)
Dept: BEHAVIORAL HEALTH | Facility: CLINIC | Age: 45
End: 2025-07-24
Payer: COMMERCIAL

## 2025-07-24 DIAGNOSIS — F29 PSYCHOSIS, UNSPECIFIED PSYCHOSIS TYPE (H): Primary | ICD-10-CM

## 2025-07-24 PROCEDURE — 1002N00001 HC LODGING PLUS FACILITY CHARGE ADULT

## 2025-07-24 PROCEDURE — H2035 A/D TX PROGRAM, PER HOUR: HCPCS | Mod: HQ

## 2025-07-24 RX ORDER — PALIPERIDONE 3 MG/1
3 TABLET, EXTENDED RELEASE ORAL EVERY MORNING
Qty: 7 TABLET | Refills: 0 | Status: SHIPPED | OUTPATIENT
Start: 2025-07-24

## 2025-07-24 NOTE — ADDENDUM NOTE
Encounter addended by: Brenda Cunningham LMFT on: 7/24/2025 12:06 PM   Actions taken: Charge Capture section accepted

## 2025-07-24 NOTE — GROUP NOTE
"Group Therapy Documentation    PATIENT'S NAME: Corinna J Schoen  MRN:   [id number]  :   1980  ACCT. NUMBER: [id number]  DATE OF SERVICE: 25  START TIME:  9:00 AM  END TIME: 11:00 AM  FACILITATOR(S): Mike Abbott LADC  TOPIC: BEH Group Therapy  Number of patients attending the group:  7  Group Length:  2 Hours    Dimensions addressed: 3, 4, 5, and 6    Summary of Group / Topics Discussed:    Recovery Principles, Sober coping skills, Balanced lifestyle, Mindfulness/Relaxation, Coping/DBT informed care, Trauma informed care, Disease of addiction, Relapse prevention, and Self-care activities    Group Attendance:  Attended group session    Patient's response to the group topic/interactions:  cooperative with task    Patient appeared to be Attentive and Engaged.        Client specific details:  Sonja participated in morning group. She checked in and reported feeling, \"optimistic.\" This was followed by the reading and discussion on perfectionism. For the second half of group the discussion included finding a purpose in recovery, if a person should do 90 meetings in 90 days, and how it is important to find a balance in recovery.    Suicide ideation: 0.         2025     1:00 PM   Suicide Ideation Check In   Since last session, how often have you had suicidal thoughts? No thoughts of suicide       "

## 2025-07-24 NOTE — TELEPHONE ENCOUNTER
Writer spoke with pt to understand what meds have helped her in the past    Per pt Latuda is the only med that has helped with her auditory hallucinations, but she get violently ill with nausea and vomiting for 3 days when she first starts it and it scared to go through that while in treatment   Aripiprazole--made her suicidal  Lurasidone-liked but bad side effects   Olanzapine-made her suicidal  Quetiapine-did not help  Risperidone-did not help  Haloperidol-did not like      I dont think pt has tried any of these   Ziprasidone  Asenapine  Clozapine  Iloperidone  Paliperidone  brexpiprazole    cariprazine

## 2025-07-24 NOTE — GROUP NOTE
Group Therapy Documentation    PATIENT'S NAME: Corinna J Schoen  MRN:   [id number]  :   1980  ACCT. NUMBER: [id number]  DATE OF SERVICE: 25  START TIME:  3:00 PM  END TIME:  4:00 PM  FACILITATOR(S): Jenni Denny LADC; Mike Abbott LADC; Yoly Linares LADC  TOPIC: BEH Group Therapy  Number of patients attending the group:  17  Group Length:  1 Hours    Dimensions addressed: 3, 4, and 5    Summary of Group / Topics Discussed:    Coping/DBT informed care  Facilitator discussed about DBT and varying skills to utilized when triggered or craving.     Group Attendance:  Attended group session    Patient's response to the group topic/interactions:  cooperative with task    Patient appeared to be Engaged.        Client specific details:  Sonja present during the PM skills group and participated in sufficient discussion.

## 2025-07-24 NOTE — TELEPHONE ENCOUNTER
Left Voicemail (1st Attempt) and Sent Mychart (1st Attempt) for the patient to call back and schedule the following:    Appointment type: [facility] Re-Establish New  Provider: Any provider accepting new patients, including residents  Return date: Next available  Specialty phone number: [phone]    Additional Notes:   Previously seen in [place], [place] to schedule per Roberto Boyer

## 2025-07-24 NOTE — TELEPHONE ENCOUNTER
Pt does not have her phone, we need to be called for scheduling or reply to this message with appt date and time.

## 2025-07-24 NOTE — GROUP NOTE
Group Therapy Documentation    PATIENT'S NAME: Corinna J Schoen  MRN:   [id number]  :   1980  ACCT. NUMBER: [id number]  DATE OF SERVICE: 25  START TIME: 12:30 PM  END TIME:  2:30 PM  FACILITATOR(S): Jenni Denny LADC  TOPIC: BEH Group Therapy  Number of patients attending the group:  7  Group Length:  2 Hours    Dimensions addressed: 3, 4, 5, and 6    Summary of Group / Topics Discussed:    Recovery Principles, Relationship/socialization, Balanced lifestyle, Coping/DBT informed care, Disease of addiction, Emotions/expression, and Relapse prevention  Daily check-in, reading and discussion based, graduation ceremony and assignment presentation of relapse prevention plan. Ended with serenity prayer.     Group Attendance:  Attended group session    Patient's response to the group topic/interactions:  cooperative with task    Patient appeared to be Engaged.        Client specific details:  Sonja listened attentively and shared appropriate feedbacks.

## 2025-07-25 ENCOUNTER — HOSPITAL ENCOUNTER (OUTPATIENT)
Dept: BEHAVIORAL HEALTH | Facility: CLINIC | Age: 45
Discharge: HOME | End: 2025-07-25
Attending: FAMILY MEDICINE
Payer: COMMERCIAL

## 2025-07-25 DIAGNOSIS — F17.200 NICOTINE DEPENDENCE: Primary | ICD-10-CM

## 2025-07-25 DIAGNOSIS — F15.20 AMPHETAMINE DEPENDENCE (H): ICD-10-CM

## 2025-07-25 PROCEDURE — H2035 A/D TX PROGRAM, PER HOUR: HCPCS | Mod: HQ

## 2025-07-25 PROCEDURE — 1002N00001 HC LODGING PLUS FACILITY CHARGE ADULT

## 2025-07-25 NOTE — GROUP NOTE
"Group Therapy Documentation    PATIENT'S NAME: Corinna J Schoen  MRN:   [id number]  :   1980  ACCT. NUMBER: [id number]  DATE OF SERVICE: 25  START TIME:  9:00 AM  END TIME: 11:00 AM  FACILITATOR(S): Jenni Denny LADC  TOPIC: BEH Group Therapy  Number of patients attending the group:  6  Group Length:  2 Hours    Dimensions addressed: 3, 4, 5, and 6    Summary of Group / Topics Discussed:    Recovery Principles, Sober coping skills, Relationship/socialization, Balanced lifestyle, Mindfulness/Relaxation, Coping/DBT informed care, Disease of addiction, Emotions/expression, Relapse prevention, and Self-care activities    Group Attendance:  Attended group session    Patient's response to the group topic/interactions:  cooperative with task    Patient appeared to be Engaged.        Client specific details:  Sonja shared feeling \"so-so; tired\". Sonja reports concerns of her mental health and was encouraged to speak with nursing to collaborate with getting the appropriate referrals.  Sonja listened attentively and participated in session. Shared appropriate feedbacks.  Suicide ideation: 0.         2025     1:00 PM   Suicide Ideation Check In   Since last session, how often have you had suicidal thoughts? No thoughts of suicide         "

## 2025-07-26 ENCOUNTER — HOSPITAL ENCOUNTER (OUTPATIENT)
Dept: BEHAVIORAL HEALTH | Facility: CLINIC | Age: 45
Discharge: HOME | End: 2025-07-26
Attending: FAMILY MEDICINE
Payer: COMMERCIAL

## 2025-07-26 PROCEDURE — 1002N00001 HC LODGING PLUS FACILITY CHARGE ADULT

## 2025-07-26 PROCEDURE — H2035 A/D TX PROGRAM, PER HOUR: HCPCS | Mod: HQ

## 2025-07-26 NOTE — GROUP NOTE
Group Therapy Documentation    PATIENT'S NAME: Corinna J Schoen  MRN:   [id number]  :   1980  ACCT. NUMBER: [id number]  DATE OF SERVICE: 25  START TIME: 12:30 PM  END TIME:  2:30 PM  FACILITATOR(S): She Syed LADC  TOPIC: BEH Group Therapy  Number of patients attending the group:  16  Group Length:  2 Hours    Dimensions addressed: 4, 5, and 6    Summary of Group / Topics Discussed:    Recovery Principles, Relationship/socialization, Emotions/expression, and Relapse prevention    Facilitator HANS Patino conducted a 120 minute lecture on Sexual Boundaries in [facility]. Lecture was interactive and allowed patients to ask questions and process in a group setting.     Group Attendance:  Attended group session    Patient's response to the group topic/interactions:  cooperative with task    Patient appeared to be Alert and Engaged.        Client specific details:  Sonja Attended small group therapy. Patient engaged in discussion and participated in sharing feedback to their peers.

## 2025-07-26 NOTE — ADDENDUM NOTE
Encounter addended by: She Syed LADC on: 7/26/2025 10:45 AM   Actions taken: Clinical Note Signed, Visit diagnoses modified, Charge Capture section accepted

## 2025-07-26 NOTE — GROUP NOTE
Group Therapy Documentation    PATIENT'S NAME: Corinna J Schoen  MRN:   [id number]  :   1980  ACCT. NUMBER: [id number]  DATE OF SERVICE: 25  START TIME:  9:00 AM  END TIME: 11:00 AM  FACILITATOR(S): She Syed LADC; Anabell Son LADC  TOPIC: BEH Group Therapy  Number of patients attending the group:  15  Group Length:  2 Hours    Dimensions addressed: 3 and 5    Summary of Group / Topics Discussed:    Sober coping skills and Relapse prevention    Group Attendance:  Attended group session    Patient's response to the group topic/interactions:  cooperative with task    Patient appeared to be Actively participating, Attentive, and Engaged.        Client specific details: Pt listened respectfully to a presentation on communication styles and skills, and took part in the associated activity.    Suicide ideation: 0.         2025     2:00 PM   Suicide Ideation Check In   Since last session, how often have you had suicidal thoughts? No thoughts of suicide

## 2025-07-26 NOTE — GROUP NOTE
Group Therapy Documentation    PATIENT'S NAME: Corinna J Schoen  MRN:   [id number]  :   1980  ACCT. NUMBER: [id number]  DATE OF SERVICE: 25  START TIME: 12:30 PM  END TIME:  2:30 PM  FACILITATOR(S): She Syed LADC  TOPIC: BEH Group Therapy  Number of patients attending the group:  7  Group Length:  2 Hours    Dimensions addressed: 4, 5, and 6    Summary of Group / Topics Discussed:    Disease of addiction and Relapse prevention    Patients viewed an addiction/mental health based film. This film addressed: addiction as a disease, relationships and addiction, engagement in AA, and evaluating priorities when getting sober.   Patients engaged in a thorough discussion about the film, and shared personal experiences, and how the film helped them process their own life events.     Group Attendance:  Attended group session    Patient's response to the group topic/interactions:  cooperative with task    Patient appeared to be Actively participating.        Client specific details:  Sonja Attended small group therapy. Patient engaged in discussion and participated in sharing feedback to their peers.

## 2025-07-27 ENCOUNTER — HOSPITAL ENCOUNTER (OUTPATIENT)
Dept: BEHAVIORAL HEALTH | Facility: CLINIC | Age: 45
Discharge: HOME | End: 2025-07-27
Attending: FAMILY MEDICINE
Payer: COMMERCIAL

## 2025-07-27 PROCEDURE — 1002N00001 HC LODGING PLUS FACILITY CHARGE ADULT

## 2025-07-27 PROCEDURE — H2035 A/D TX PROGRAM, PER HOUR: HCPCS | Mod: HQ

## 2025-07-27 NOTE — PROGRESS NOTES
Name: Corinna J Schoen  Date: 7/26/2025  Medical Record: [id number]    Envelope Number: [id number]    List of Contents (List each item separately in new row):   Nicotine lozenge 4 mg    Admission:  I am responsible for any personal items that are not sent to the safe or pharmacy.  [facility] is not responsible for loss, theft or damage of any property in my possession.      Patient Signature:  ___________________________________________       Date/Time:__________________________    Staff Signature: __________________________________       Date/Time:__________________________    [facility] Staff person, if patient is unable/unwilling to sign:      __________________________________________________________       Date/Time: __________________________    **All medications are packaged by [facility] staff and securely stored on [facility]. Medications left by patients at discharge will be packaged by [facility] staff and transported by [facility] staff to inpatient pharmacy for storage.**    Discharge:  [facility] has returned all of my personal belongings:    Patient Signature: ________________________________________     Date/Time: ____________________________________    Staff Signature: ______________________________________     Date/Time:_____________________________________

## 2025-07-27 NOTE — GROUP NOTE
Group Therapy Documentation    PATIENT'S NAME: Corinna J Schoen  MRN:   [id number]  :   1980  ACCT. NUMBER: [id number]  DATE OF SERVICE: 25  START TIME: 12:30 PM  END TIME:  1:30 PM  FACILITATOR(S): Mike Abbott LADC; Anabell Son LADC  TOPIC: BEH Group Therapy  Number of patients attending the group:  15  Group Length:  1 Hours    Dimensions addressed: 5 and 6    Summary of Group / Topics Discussed:    Relationship/socialization and Relapse prevention      Group Attendance:  Attended group session    Patient's response to the group topic/interactions:  cooperative with task    Patient appeared to be Attentive and Engaged.        Client specific details: Pt listened respectfully to a presentation on family roles and the impacts of addiction on family of origin.

## 2025-07-27 NOTE — GROUP NOTE
Group Therapy Documentation    PATIENT'S NAME: Corinna J Schoen  MRN:   [id number]  :   1980  ACCT. NUMBER: [id number]  DATE OF SERVICE: 25  START TIME:  9:00 AM  END TIME: 11:00 AM  FACILITATOR(S): Anabell Son LADC  TOPIC: BEH Group Therapy  Number of patients attending the group:  15  Group Length:  2 Hours    Dimensions addressed: 2 and 5    Summary of Group / Topics Discussed:    Co-occurring illnesses symptom management, Relapse prevention, and Self-care activities    Group Attendance:  Attended group session    Patient's response to the group topic/interactions:  cooperative with task    Patient appeared to be Actively participating, Attentive, and Engaged.        Client specific details:  Pt listened respectfully to a presentation on STIs and took part in the associated destigmatization activity.    Suicide ideation: 0.         2025    11:00 AM   Suicide Ideation Check In   Since last session, how often have you had suicidal thoughts? No thoughts of suicide

## 2025-07-28 ENCOUNTER — HOSPITAL ENCOUNTER (OUTPATIENT)
Dept: BEHAVIORAL HEALTH | Facility: CLINIC | Age: 45
Discharge: HOME | End: 2025-07-28
Attending: FAMILY MEDICINE
Payer: COMMERCIAL

## 2025-07-28 PROCEDURE — H2035 A/D TX PROGRAM, PER HOUR: HCPCS | Mod: HQ

## 2025-07-28 PROCEDURE — 1002N00001 HC LODGING PLUS FACILITY CHARGE ADULT

## 2025-07-28 NOTE — GROUP NOTE
Group Therapy Documentation    PATIENT'S NAME: Corinna J Schoen  MRN:   [id number]  :   1980  ACCT. NUMBER: [id number]  DATE OF SERVICE: 25  START TIME:  9:00 AM  END TIME: 11:00 AM  FACILITATOR(S): Kiya Rodriguez LADC  TOPIC: BEH Group Therapy  Number of patients attending the group:  5    Group Length:  2 Hours    Dimensions addressed: 3, 4, and 5    Summary of Group / Topics Discussed:    Recovery Principles, Sober coping skills, Relationship/socialization, and Relapse prevention    Group Attendance:  Attended group session    Patient's response to the group topic/interactions:  cooperative with task    Patient appeared to be Actively participating, Attentive, and Engaged.        Client specific details:  Patient attended group session and was attentive and participative. Patient reported that she affirmed being grateful, with a goal to ask for help regarding her housing, that she would ask for help from staff and that she's grateful for her children and grand children.    Suicide ideation: 0.         2025    11:00 AM   Suicide Ideation Check In   Since last session, how often have you had suicidal thoughts? No thoughts of suicide

## 2025-07-28 NOTE — GROUP NOTE
Group Therapy Documentation    PATIENT'S NAME: Corinna J Schoen  MRN:   [id number]  :   1980  ACCT. NUMBER: [id number]  DATE OF SERVICE: 25  START TIME: 12:30 PM  END TIME:  2:30 PM  FACILITATOR(S): Kiya Rodriguez LADC  TOPIC: BEH Group Therapy  Number of patients attending the group:  5    Group Length:  2 Hours    Dimensions addressed: 4 and 5    Summary of Group / Topics Discussed:    Spiritual Care    Group Attendance:  Attended group session    Patient's response to the group topic/interactions:  cooperative with task    Patient appeared to be Actively participating, Attentive, and Engaged.        Client specific details:  Patient attended group session and was attentive and participative.
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
## 2025-07-29 ENCOUNTER — OFFICE VISIT (OUTPATIENT)
Dept: INTERNAL MEDICINE | Facility: CLINIC | Age: 45
End: 2025-07-29
Payer: COMMERCIAL

## 2025-07-29 ENCOUNTER — OFFICE VISIT (OUTPATIENT)
Dept: ORTHOPEDICS | Facility: CLINIC | Age: 45
End: 2025-07-29
Attending: INTERNAL MEDICINE
Payer: COMMERCIAL

## 2025-07-29 ENCOUNTER — HOSPITAL ENCOUNTER (OUTPATIENT)
Dept: BEHAVIORAL HEALTH | Facility: CLINIC | Age: 45
Discharge: HOME | End: 2025-07-29
Attending: FAMILY MEDICINE
Payer: COMMERCIAL

## 2025-07-29 ENCOUNTER — LAB (OUTPATIENT)
Dept: LAB | Facility: CLINIC | Age: 45
End: 2025-07-29
Payer: COMMERCIAL

## 2025-07-29 VITALS
DIASTOLIC BLOOD PRESSURE: 83 MMHG | HEIGHT: 66 IN | HEART RATE: 62 BPM | RESPIRATION RATE: 16 BRPM | OXYGEN SATURATION: 95 % | BODY MASS INDEX: 42.95 KG/M2 | SYSTOLIC BLOOD PRESSURE: 136 MMHG

## 2025-07-29 DIAGNOSIS — S91.309A WOUND OF FOOT: ICD-10-CM

## 2025-07-29 DIAGNOSIS — E11.9 TYPE 2 DIABETES MELLITUS WITHOUT COMPLICATION, WITHOUT LONG-TERM CURRENT USE OF INSULIN (H): ICD-10-CM

## 2025-07-29 DIAGNOSIS — G89.29 CHRONIC PAIN OF LEFT KNEE: Primary | ICD-10-CM

## 2025-07-29 DIAGNOSIS — N39.41 URGE INCONTINENCE OF URINE: ICD-10-CM

## 2025-07-29 DIAGNOSIS — Z11.3 SCREENING EXAMINATION FOR STI: ICD-10-CM

## 2025-07-29 DIAGNOSIS — Z13.29 SCREENING FOR THYROID DISORDER: ICD-10-CM

## 2025-07-29 DIAGNOSIS — L93.0 LUPUS ERYTHEMATOSUS, UNSPECIFIED FORM: ICD-10-CM

## 2025-07-29 DIAGNOSIS — Z13.0 SCREENING FOR DEFICIENCY ANEMIA: ICD-10-CM

## 2025-07-29 DIAGNOSIS — N39.498 OTHER URINARY INCONTINENCE: ICD-10-CM

## 2025-07-29 DIAGNOSIS — Z23 NEED FOR COVID-19 VACCINE: ICD-10-CM

## 2025-07-29 DIAGNOSIS — E83.51 HYPOCALCEMIA: ICD-10-CM

## 2025-07-29 DIAGNOSIS — M25.562 CHRONIC PAIN OF LEFT KNEE: Primary | ICD-10-CM

## 2025-07-29 DIAGNOSIS — M17.12 PRIMARY OSTEOARTHRITIS OF LEFT KNEE: ICD-10-CM

## 2025-07-29 DIAGNOSIS — Z13.6 SCREENING FOR CARDIOVASCULAR CONDITION: Primary | ICD-10-CM

## 2025-07-29 DIAGNOSIS — S91.309A WOUND OF FOOT: Primary | ICD-10-CM

## 2025-07-29 LAB
ALBUMIN SERPL BCG-MCNC: 4.1 G/DL (ref 3.5–5.2)
ALP SERPL-CCNC: 104 U/L (ref 40–150)
ALT SERPL W P-5'-P-CCNC: 7 U/L (ref 0–50)
ANION GAP SERPL CALCULATED.3IONS-SCNC: 11 MMOL/L (ref 7–15)
AST SERPL W P-5'-P-CCNC: 19 U/L (ref 0–45)
BASOPHILS # BLD AUTO: 0 10E3/UL (ref 0–0.2)
BASOPHILS NFR BLD AUTO: 1 %
BILIRUB SERPL-MCNC: 0.3 MG/DL
BUN SERPL-MCNC: 12.9 MG/DL (ref 6–20)
CALCIUM SERPL-MCNC: 9.1 MG/DL (ref 8.8–10.4)
CHLORIDE SERPL-SCNC: 105 MMOL/L (ref 98–107)
CREAT SERPL-MCNC: 0.67 MG/DL (ref 0.51–0.95)
CREAT UR-MCNC: 56.8 MG/DL
EGFRCR SERPLBLD CKD-EPI 2021: >90 ML/MIN/1.73M2
EOSINOPHIL # BLD AUTO: 0.3 10E3/UL (ref 0–0.7)
EOSINOPHIL NFR BLD AUTO: 6 %
ERYTHROCYTE [DISTWIDTH] IN BLOOD BY AUTOMATED COUNT: 13.1 % (ref 10–15)
GLUCOSE SERPL-MCNC: 90 MG/DL (ref 70–99)
HBV SURFACE AB SERPL IA-ACNC: <3.5 M[IU]/ML
HBV SURFACE AB SERPL IA-ACNC: NONREACTIVE M[IU]/ML
HBV SURFACE AG SERPL QL IA: NONREACTIVE
HCO3 SERPL-SCNC: 23 MMOL/L (ref 22–29)
HCT VFR BLD AUTO: 42.2 % (ref 35–47)
HCV AB SERPL QL IA: NONREACTIVE
HGB BLD-MCNC: 14.2 G/DL (ref 11.7–15.7)
HIV 1+2 AB+HIV1 P24 AG SERPL QL IA: NONREACTIVE
HSV1 IGG SERPL QL IA: 40.6 INDEX
HSV1 IGG SERPL QL IA: ABNORMAL
HSV2 IGG SERPL QL IA: 6.4 INDEX
HSV2 IGG SERPL QL IA: ABNORMAL
IMM GRANULOCYTES # BLD: 0 10E3/UL
IMM GRANULOCYTES NFR BLD: 0 %
LYMPHOCYTES # BLD AUTO: 1.3 10E3/UL (ref 0.8–5.3)
LYMPHOCYTES NFR BLD AUTO: 26 %
MCH RBC QN AUTO: 29.9 PG (ref 26.5–33)
MCHC RBC AUTO-ENTMCNC: 33.6 G/DL (ref 31.5–36.5)
MCV RBC AUTO: 89 FL (ref 78–100)
MICROALBUMIN UR-MCNC: 21.5 MG/L
MICROALBUMIN/CREAT UR: 37.85 MG/G CR (ref 0–25)
MONOCYTES # BLD AUTO: 0.4 10E3/UL (ref 0–1.3)
MONOCYTES NFR BLD AUTO: 9 %
NEUTROPHILS # BLD AUTO: 2.8 10E3/UL (ref 1.6–8.3)
NEUTROPHILS NFR BLD AUTO: 59 %
NRBC # BLD AUTO: 0 10E3/UL
NRBC BLD AUTO-RTO: 0 /100
PLATELET # BLD AUTO: 304 10E3/UL (ref 150–450)
POTASSIUM SERPL-SCNC: 4.5 MMOL/L (ref 3.4–5.3)
PROT SERPL-MCNC: 7.4 G/DL (ref 6.4–8.3)
RBC # BLD AUTO: 4.75 10E6/UL (ref 3.8–5.2)
SODIUM SERPL-SCNC: 139 MMOL/L (ref 135–145)
T PALLIDUM AB SER QL: NONREACTIVE
TSH SERPL DL<=0.005 MIU/L-ACNC: 0.94 UIU/ML (ref 0.3–4.2)
VIT D+METAB SERPL-MCNC: 14 NG/ML (ref 20–50)
WBC # BLD AUTO: 4.8 10E3/UL (ref 4–11)

## 2025-07-29 PROCEDURE — 82570 ASSAY OF URINE CREATININE: CPT

## 2025-07-29 PROCEDURE — 86780 TREPONEMA PALLIDUM: CPT

## 2025-07-29 PROCEDURE — 80053 COMPREHEN METABOLIC PANEL: CPT | Performed by: PATHOLOGY

## 2025-07-29 PROCEDURE — 84443 ASSAY THYROID STIM HORMONE: CPT | Performed by: PATHOLOGY

## 2025-07-29 PROCEDURE — H2035 A/D TX PROGRAM, PER HOUR: HCPCS | Mod: HQ | Performed by: COUNSELOR

## 2025-07-29 PROCEDURE — 82043 UR ALBUMIN QUANTITATIVE: CPT

## 2025-07-29 PROCEDURE — 90480 ADMN SARSCOV2 VAC 1/ONLY CMP: CPT

## 2025-07-29 PROCEDURE — 3079F DIAST BP 80-89 MM HG: CPT

## 2025-07-29 PROCEDURE — 86696 HERPES SIMPLEX TYPE 2 TEST: CPT

## 2025-07-29 PROCEDURE — 36415 COLL VENOUS BLD VENIPUNCTURE: CPT | Performed by: PATHOLOGY

## 2025-07-29 PROCEDURE — 91320 SARSCV2 VAC 30MCG TRS-SUC IM: CPT

## 2025-07-29 PROCEDURE — 87389 HIV-1 AG W/HIV-1&-2 AB AG IA: CPT

## 2025-07-29 PROCEDURE — 87340 HEPATITIS B SURFACE AG IA: CPT

## 2025-07-29 PROCEDURE — 99000 SPECIMEN HANDLING OFFICE-LAB: CPT | Performed by: PATHOLOGY

## 2025-07-29 PROCEDURE — 3075F SYST BP GE 130 - 139MM HG: CPT

## 2025-07-29 PROCEDURE — 85025 COMPLETE CBC W/AUTO DIFF WBC: CPT | Performed by: PATHOLOGY

## 2025-07-29 PROCEDURE — 86803 HEPATITIS C AB TEST: CPT

## 2025-07-29 PROCEDURE — 99214 OFFICE O/P EST MOD 30 MIN: CPT | Mod: 25

## 2025-07-29 PROCEDURE — 99213 OFFICE O/P EST LOW 20 MIN: CPT | Performed by: STUDENT IN AN ORGANIZED HEALTH CARE EDUCATION/TRAINING PROGRAM

## 2025-07-29 PROCEDURE — 82306 VITAMIN D 25 HYDROXY: CPT

## 2025-07-29 PROCEDURE — 1002N00001 HC LODGING PLUS FACILITY CHARGE ADULT

## 2025-07-29 PROCEDURE — 86695 HERPES SIMPLEX TYPE 1 TEST: CPT

## 2025-07-29 PROCEDURE — 86706 HEP B SURFACE ANTIBODY: CPT

## 2025-07-29 PROCEDURE — H2035 A/D TX PROGRAM, PER HOUR: HCPCS | Mod: HQ

## 2025-07-29 ASSESSMENT — ANXIETY QUESTIONNAIRES
5. BEING SO RESTLESS THAT IT IS HARD TO SIT STILL: SEVERAL DAYS
7. FEELING AFRAID AS IF SOMETHING AWFUL MIGHT HAPPEN: NEARLY EVERY DAY
4. TROUBLE RELAXING: NEARLY EVERY DAY
3. WORRYING TOO MUCH ABOUT DIFFERENT THINGS: NEARLY EVERY DAY
2. NOT BEING ABLE TO STOP OR CONTROL WORRYING: NEARLY EVERY DAY
GAD7 TOTAL SCORE: 19
2. FELT ANXIOUS, WORRIED, OR NERVOUS: NEARLY EVERY DAY
6. BECOMING EASILY ANNOYED OR IRRITABLE: NEARLY EVERY DAY

## 2025-07-29 ASSESSMENT — PATIENT HEALTH QUESTIONNAIRE - PHQ9: SUM OF ALL RESPONSES TO PHQ QUESTIONS 1-9: 14

## 2025-07-29 NOTE — GROUP NOTE
Group Therapy Documentation    PATIENT'S NAME: Corinna J Schoen  MRN:   [id number]  :   1980  ACCT. NUMBER: [id number]  DATE OF SERVICE: 25  START TIME:  9:00 AM  END TIME: 11:00 AM  FACILITATOR(S): Jenni Denny LADC  TOPIC: BEH Group Therapy  Number of patients attending the group:  7  Group Length:  2 Hours    Dimensions addressed: 3, 4, 5, and 6    Summary of Group / Topics Discussed:    Recovery Principles, Sober coping skills, Relationship/socialization, Mindfulness/Relaxation, Disease of addiction, Emotions/expression, and Self-care activities  Daily check-in; assignment presentation; Desiderata poem discussion and sharing; feedbacks; serenity prayer.     Group Attendance:  Attended group session    Patient's response to the group topic/interactions:  cooperative with task    Patient appeared to be Engaged.        Client specific details:  Sonja shared feeling blessed. Sonja presented her step one and received appropriate feedbacks. Sonja left group early to attend her medical appointment.  Suicide ideation: 0.         2025    11:00 AM   Suicide Ideation Check In   Since last session, how often have you had suicidal thoughts? No thoughts of suicide
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
## 2025-07-29 NOTE — PROGRESS NOTES
[facility] Weekly Treatment Plan Review        Treatment plan review for the following date span:  7/13/25-- 7/19/25    ATTENDANCE  Patient did not have any absences during this time period     Weekly Treatment Plan Review     Treatment Plan initiated on: 7/15/25.    Dimension1: Acute Intoxication/Withdrawal Potential -   Date of Last Use 7/9/25  Any reports of withdrawal symptoms - No    Dimension 2: Biomedical Conditions & Complications -   Medical Concerns:  Patient expressed wanting to discuss medications with MD. Patient reported concerns with Lamotrigine side effects.   Per EHR: 7/18/25  Pt approached writer and stated she had forgotten to tell her psychiatrist when she saw him earlier this week that she is having nightmares and leg shaking overnight. She requested getting some sort of as needed medication to take at night for this. LPRN called pt's psychiatric provider at [facility] and left a message detailing this and requesting a medication be sent. LPRN phone number was left with the message.       Patient was then scheduled to meet with a psychiatrist from her care network regarding mediations 7/22/25.       Vitals:   BP Readings from Last 3 Encounters:   07/17/25 (!) 152/93   07/05/25 124/80   06/27/25 92/67     Pulse Readings from Last 3 Encounters:   07/17/25 97   07/05/25 73   06/27/25 81     Wt Readings from Last 3 Encounters:   07/17/25 120.7 kg (266 lb)   07/05/25 120.7 kg (266 lb)   05/30/25 123.2 kg (271 lb 9.6 oz)     Temp Readings from Last 3 Encounters:   07/17/25 98.4  F (36.9  C) (Oral)   07/05/25 97.3  F (36.3  C) (Temporal)   06/27/25 97.7  F (36.5  C) (Oral)      Current Medications & Medication Changes:  Current Outpatient Medications   Medication Sig Dispense Refill    albuterol (PROAIR HFA/PROVENTIL HFA/VENTOLIN HFA) 108 (90 Base) MCG/ACT inhaler Inhale 2 puffs into the lungs every 6 hours as needed for shortness of breath, wheezing or cough. 18 g 0    benzocaine-menthol  (CHLORASEPTIC) 6-10 MG lozenge Place 1 lozenge inside cheek every 2 hours as needed for sore throat.      bisacodyl (DULCOLAX) 5 MG EC tablet Two days prior to exam take two (2) tablets at 4pm. One day prior to exam take two (2) tablets at 4pm 4 tablet 0    cloNIDine (CATAPRES) 0.1 MG tablet Take 0.1 mg by mouth daily as needed (anxiety).      diclofenac (VOLTAREN) 1 % topical gel Apply 4 g topically 4 times daily. 100 g 3    gabapentin (NEURONTIN) 300 MG capsule Take 300 mg by mouth 3 times daily.      guaiFENesin (ROBITUSSIN) 20 mg/mL liquid Take 200 mg by mouth every 4 hours as needed for cough.      Incontinence Supply Disposable (DEPEND UNDERWEAR LARGE/XL) MISC Depends Incontinence Brief -- Sizing as per patient need 17 each 11    lamoTRIgine (LAMICTAL) 25 MG tablet Take 25 mg by mouth daily.      loratadine (CLARITIN) 10 MG tablet Take 10 mg by mouth daily.      melatonin 5 MG tablet Take 5-10 mg by mouth nightly as needed for sleep.      naloxone (NARCAN) 4 MG/0.1ML nasal spray Spray 4 mg into one nostril alternating nostrils as needed for opioid reversal. every 2-3 minutes until assistance arrives      naproxen (NAPROSYN) 500 MG tablet Take 1 tablet (500 mg) by mouth 2 times daily (with meals). 20 tablet 0    nicotine (NICODERM CQ) 21 MG/24HR 24 hr patch Place 1 patch onto the skin every 24 hours. Lodging plus 28 patch 1    nicotine (NICORETTE) 4 MG lozenge Place 1 lozenge (4 mg) inside cheek every hour as needed for nicotine withdrawal symptoms. 108 lozenge 1    nicotine polacrilex (NICORETTE) 4 MG gum Place 1 each (4 mg) inside cheek every hour as needed for nicotine withdrawal symptoms. 220 each 1    paliperidone ER (INVEGA) 3 MG 24 hr tablet Take 1 tablet (3 mg) by mouth every morning. 7 tablet 0    polyethylene glycol (GOLYTELY) 236 g suspension Two days before procedure at 5PM fill first container with water. Mix and drink an 8 oz glass every 15 minutes until HALF of the container is gone. Place the  remainder in the refrigerator. One day before procedure at 5PM drink second half of bowel prep. Drink an 8 oz glass every 15 minutes until it is gone. Day of procedure 6 hours before arrival time fill the 2nd container with water. Mix and drink an 8 oz glass every 15 minutes until HALF of the container is gone. Discard the remaining solution. 8000 mL 0    senna-docusate (SENOKOT-S/PERICOLACE) 8.6-50 MG tablet Take 2 tablets by mouth daily as needed for constipation.      sulfamethoxazole-trimethoprim (BACTRIM DS) 800-160 MG tablet Take 1 tablet by mouth 2 times daily. 20 tablet 0     No current facility-administered medications for this encounter.     Facility-Administered Medications Ordered in Other Encounters   Medication Dose Route Frequency Provider Last Rate Last Admin    Self Administer Medications: Behavioral Services   Does not apply See Admin Instructions Halie Castillo MD         Taking meds as prescribed? {No Yes Crystal:271827}  Medication side effects or concerns:  ***  Outside medical appointments this week (list provider and reason for visit):  ***      Dimension 3: Emotional/Behavioral Conditions & Complications -   Mental health diagnosis Schizoeffective Disorder    Date of last SIB:  Wrist Cutting 2023  Date of  last SI:  2023  Date of last HI: N/A  Behavioral Targets:  Impulsivity, Alcohol Use, Distress Tolerance, Emotional Regulation, Coping skills  Current  Assignments:  Shame Spiral, Denial & Consequences, and Fight Flight Freeze    PHQ2:       7/29/2025    10:00 AM 7/22/2025    11:00 AM 7/14/2025     2:00 PM 3/21/2025     2:37 PM   PHQ-2 ( 1999 [employer])   Q1: Little interest or pleasure in doing things 0 0 1 0    Q2: Feeling down, depressed or hopeless 0 3 1 0    PHQ-2 Score 0 3 2 0    Q1: Little interest or pleasure in doing things    Not at all   Q2: Feeling down, depressed or hopeless    Not at all   PHQ-2 Score    0       Proxy-reported      GAD2:       7/14/2025     2:00 PM 7/22/2025  "   11:00 AM 7/29/2025    10:00 AM   NEPTALI-2   Feeling nervous, anxious, or on edge 3 3 3   Not being able to stop or control worrying 3 1 3   NEPTALI-2 Total Score 6 4 6      Narrative:  Current Mental Health symptoms include: Anxiety, Insomnia, Nightmares, Agitation. Patient reproted feeling irriated this week and has noticed an increase in her mental health symtoms. Patient reported feeling her stress level has gone up. Patient reported also struggling to adjust with peers in treatment, due to feeling \" left out\" or not part of the group. Patient expressed being willing to give it time and attempt to reach out to peers to connect.    Active interventions to stabilize mental health symptoms this week Small group therapy, individual therapy sessions, skills groups, interactions with peers in safe, stable and structured environment.         Dimension 4: Treatment Acceptance / Resistance -   MARRY Diagnosis:  Stimulant Use Disorder:  In early remission, , Specify current severity:  Severe  304.40 (F15.20) Severe, Amphetamine type substance  Commitment to tx process/Stage of change- Contemplation Stage  MARRY assignments -Alc/Drug History, Step One   Narrative - Patient has started to adjust into groups, and sharing what brings her to treatment. Most of what patient shares has been linked with her children and being able to be in her grandchildren lives. Patient reports being able to be sober and in their lives is her most motivating factor for her at this point. Patient expressed struggling to manage stress, and doesn't know many coping skills. Patient was prompted to ask for help from peers and staff. Patient reported struggling with asking for help, but has been observed asking staff on multiple occasions, and appears to be opening up.       Dimension 5: Relapse / Continued Problem Potential -   Relapses this week - None  Urges to use - Yes  UA results -   7/11/25-Positive for AMP, mAMP, mdma and THC  7/13/25- Positive for " AMP, mAMP, and THC  7/13/25- Positive for AMP, mAMP  7/14/25-Positive  mAMP  7/15/25--Negative all  7/17/25- Negative all  7/21/25- Negative all  Identified triggers - None. Patient expressed not being able to identify at this time.   Coping skills identified - None.  Patient is not able to utilize these skills when needed.   Narrative- ***    Dimension 6: Recovery Environment -   Family Involvement -   Summarize attendance at family groups and family sessions - ***  Family supportive of program/stages?  {No Yes Crystal:390223}  Concerns about parental supervision:  {YES/NO:60}    Community support group attendance - ***  Recreational activities - ***      Additional Narrative - ***    Progress made on transition planning goals: ***    Justification for Continued Treatment at this Level of Care:  ***  Treatment coordination activities this week:  {OP BEH TREATMENT COORDINATION:450054}  Need for peer recovery support referral? {YES/NO:60}    Discharge Planning:  Target Discharge Date/Timeframe:  ***   Med Mgmt Provider/Appt:  ***   Ind therapy Provider/Appt:  ***   Family therapy Provider/Appt:  ***   Phase II plan:  ***   School enrollment:  ***   Other referrals:  ***        Dimension Scale Review     Prior ratings: Dim1 - 0 DIM2 - 1 DIM3 - {Numbers; 0-4:578855} DIM4 - {Numbers; 0-4:550643} DIM5 - {Numbers; 0-4:276038} DIM6 -{Numbers; 0-4:508326}     Current ratings: Dim1 - {Numbers; 0-4:058200} DIM2 - {Numbers; 0-4:239708} DIM3 - {Numbers; 0-4:780945} DIM4 - {Numbers; 0-4:557734} DIM5 - {Numbers; 0-4:817479} DIM6 -{Numbers; 0-4:538465}       If client is 18 or older, has vulnerable adult status change? {YES/NO:937808}    Are Treatment Plan goals/objectives effective? {Yes/No/:383594}  *If no, list changes to treatment plan:    Are the current goals meeting client's needs? {Yes/No/:015107}  *If no, list the changes to treatment plan.        *Client agrees with any changes to the treatment plan:  {YES/NO:169961}  *Client received copy of changes: {YES/NO:326411}  *Client is aware of right to access a treatment plan review: {YES/NO:383243};y

## 2025-07-29 NOTE — GROUP NOTE
Psychoeducation Group Documentation    PATIENT'S NAME: Corinna J Schoen  MRN:   [id number]  :   1980  ACCT. NUMBER: [id number]  DATE OF SERVICE: 25  START TIME:  3:00 PM  END TIME:  4:00 PM  FACILITATOR(S): Yoly Linares [facility]; Gilda Earl RN; Kiya Rodriguez [facility]; Jose Silva [facility]  TOPIC: BEH Pyschoeducation  Number of patients attending the group:  16  Group Length:  1 Hours    Skills Group Therapy Type: Healthy behaviors development    Summary of Group / Topics Discussed:    Balanced lifestyle skills and Medication management skills    Patients received a lecture by staff RN on healthy lifestyle management.      Group Attendance:  Attended group session    Patient's response to the group topic/interactions:  listened actively    Patient appeared to be Attentive and Engaged.         Client specific details:  Patient appeared to actively participate in this lecture.
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
## 2025-07-29 NOTE — GROUP NOTE
Group Therapy Documentation    PATIENT'S NAME: Corinna J Schoen  MRN:   [id number]  :   1980  ACCT. NUMBER: [id number]  DATE OF SERVICE: 25  START TIME: 12:30 PM  END TIME:  2:30 PM  FACILITATOR(S): She Syed LADC  TOPIC: BEH Group Therapy  Number of patients attending the group:  8  Group Length:  2 Hours    Dimensions addressed: 4, 5, and 6    Summary of Group / Topics Discussed:    Recovery Principles, Emotions/expression, and Relapse prevention    Group Objectives:   -Daily Check Ins   -Question of the Day  -Read Daily Meditations and discussion of related themes.   -Present Treatment Plan Assignments  - Group Process Time              Group Attendance:  Excused from group session for doc appt on [place]

## 2025-07-30 ENCOUNTER — TELEPHONE (OUTPATIENT)
Dept: PSYCHIATRY | Facility: CLINIC | Age: 45
End: 2025-07-30
Payer: COMMERCIAL

## 2025-07-30 ENCOUNTER — HOSPITAL ENCOUNTER (OUTPATIENT)
Dept: BEHAVIORAL HEALTH | Facility: CLINIC | Age: 45
Discharge: HOME | End: 2025-07-30
Attending: FAMILY MEDICINE
Payer: COMMERCIAL

## 2025-07-30 ENCOUNTER — OFFICE VISIT (OUTPATIENT)
Dept: PSYCHIATRY | Facility: CLINIC | Age: 45
End: 2025-07-30
Attending: PSYCHIATRY & NEUROLOGY
Payer: COMMERCIAL

## 2025-07-30 ENCOUNTER — PATIENT OUTREACH (OUTPATIENT)
Dept: CARE COORDINATION | Facility: CLINIC | Age: 45
End: 2025-07-30
Payer: COMMERCIAL

## 2025-07-30 VITALS — DIASTOLIC BLOOD PRESSURE: 83 MMHG | SYSTOLIC BLOOD PRESSURE: 136 MMHG | HEART RATE: 57 BPM

## 2025-07-30 DIAGNOSIS — F10.21 SEVERE ALCOHOL USE DISORDER, IN EARLY REMISSION (H): ICD-10-CM

## 2025-07-30 DIAGNOSIS — F51.05 INSOMNIA DUE TO OTHER MENTAL DISORDER: ICD-10-CM

## 2025-07-30 DIAGNOSIS — F41.9 ANXIETY: ICD-10-CM

## 2025-07-30 DIAGNOSIS — F99 INSOMNIA DUE TO OTHER MENTAL DISORDER: ICD-10-CM

## 2025-07-30 DIAGNOSIS — R44.0 AUDITORY HALLUCINATIONS: ICD-10-CM

## 2025-07-30 DIAGNOSIS — F43.10 PTSD (POST-TRAUMATIC STRESS DISORDER): ICD-10-CM

## 2025-07-30 DIAGNOSIS — F15.20 SEVERE STIMULANT USE DISORDER (H): Primary | ICD-10-CM

## 2025-07-30 DIAGNOSIS — R11.2 DRUG-INDUCED NAUSEA AND VOMITING: ICD-10-CM

## 2025-07-30 DIAGNOSIS — T50.905A DRUG-INDUCED NAUSEA AND VOMITING: ICD-10-CM

## 2025-07-30 PROCEDURE — 1125F AMNT PAIN NOTED PAIN PRSNT: CPT

## 2025-07-30 PROCEDURE — 99204 OFFICE O/P NEW MOD 45 MIN: CPT | Mod: GC

## 2025-07-30 PROCEDURE — H2035 A/D TX PROGRAM, PER HOUR: HCPCS | Mod: HQ

## 2025-07-30 PROCEDURE — 3079F DIAST BP 80-89 MM HG: CPT

## 2025-07-30 PROCEDURE — 1002N00001 HC LODGING PLUS FACILITY CHARGE ADULT

## 2025-07-30 PROCEDURE — 3075F SYST BP GE 130 - 139MM HG: CPT

## 2025-07-30 RX ORDER — ONDANSETRON 4 MG/1
4 TABLET, ORALLY DISINTEGRATING ORAL EVERY 8 HOURS PRN
Qty: 20 TABLET | Refills: 0 | Status: SHIPPED | OUTPATIENT
Start: 2025-07-30

## 2025-07-30 RX ORDER — LURASIDONE HYDROCHLORIDE 40 MG/1
40 TABLET, FILM COATED ORAL
Qty: 30 TABLET | Refills: 1 | Status: SHIPPED | OUTPATIENT
Start: 2025-07-30

## 2025-07-30 RX ORDER — GABAPENTIN 300 MG/1
300 CAPSULE ORAL AT BEDTIME
Qty: 30 CAPSULE | Refills: 2 | Status: SHIPPED | OUTPATIENT
Start: 2025-07-30 | End: 2025-07-31

## 2025-07-30 ASSESSMENT — PAIN SCALES - GENERAL: PAINLEVEL_OUTOF10: SEVERE PAIN (8)

## 2025-07-30 NOTE — GROUP NOTE
Group Therapy Documentation    PATIENT'S NAME: Corinna J Schoen  MRN:   [id number]  :   1980  ACCT. NUMBER: [id number]  DATE OF SERVICE: 25  START TIME: 12:30 PM  END TIME:  2:30 PM  FACILITATOR(S): She Syed LADC  TOPIC: BEH Group Therapy  Number of patients attending the group:  8  Group Length:  2 Hours    Dimensions addressed: 3 and 4    Summary of Group / Topics Discussed:    Recovery Principles, Balanced lifestyle, Mindfulness/Relaxation, and Emotions/expression    Group Objectives:   -Daily Check Ins (feelings, affirmations, goals, asking for help, self harm, C-SSRS Screening)  -Question of the Day  -Read Daily Meditations and discussion of related themes.   -Present Treatment Plan Assignments  - Group Process Time                    Group Attendance:  Attended group session    Patient's response to the group topic/interactions:  cooperative with task    Patient appeared to be Actively participating.        Client specific details:  Sonja Attended small group therapy. Patient engaged in discussion and participated in sharing feedback to their peers.

## 2025-07-30 NOTE — TELEPHONE ENCOUNTER
Pt reported to writer that she wants to stop lamotrigine immediately and wants the med destroyed. Please advise if it is ok to have med discharge today and destroyed.

## 2025-07-30 NOTE — GROUP NOTE
Psychoeducation Group Documentation    PATIENT'S NAME: Corinna J Schoen  MRN:   [id number]  :   1980  ACCT. NUMBER: [id number]  DATE OF SERVICE: 25  START TIME:  8:30 AM  END TIME:  9:30 AM  FACILITATOR(S): Mike Abbott LADC; She Syed LADC  TOPIC: BEH Pyschoeducation  Number of patients attending the group:  8  Group Length:  1 Hours    Skills Group Therapy Type: Healthy behaviors development and Medication education    Summary of Group / Topics Discussed:    Symptom management skills and Medication management skills        Group Attendance:  Excused from group session    Patient's response to the group topic/interactions:  the patient was not present.    Patient appeared to be Non-participatory.         Client specific details:  The patient was excused from the morning lecture due to being in a medical appointment.

## 2025-07-30 NOTE — GROUP NOTE
Group Therapy Documentation    PATIENT'S NAME: Corinna J Schoen  MRN:   [id number]  :   1980  ACCT. NUMBER: [id number]  DATE OF SERVICE: 25  START TIME:  9:45 AM  END TIME: 11:20 AM  FACILITATOR(S): Jenni Denny LADC  TOPIC: BEH Group Therapy  Number of patients attending the group:  7  Group Length:  2 Hours    Dimensions addressed: 3, 4, 5, and 6    Summary of Group / Topics Discussed:    Recovery Principles, Sober coping skills, Relationship/socialization, Balanced lifestyle, Disease of addiction, Emotions/expression, and Relapse prevention  Daily check-in with questions, assignment presentation and process, discussion and peers feedbacks. Serenity prayer.       Group Attendance:  Attended group session    Patient's response to the group topic/interactions:  cooperative with task    Patient appeared to be Engaged.        Client specific details:  Sonja shared feeling discombobulated. Sonja listened attentively and shared appropriate feedbacks.   Suicide ideation: 0.         2025    11:00 AM   Suicide Ideation Check In   Since last session, how often have you had suicidal thoughts? No thoughts of suicide

## 2025-07-31 DIAGNOSIS — M17.12 PRIMARY OSTEOARTHRITIS OF LEFT KNEE: ICD-10-CM

## 2025-07-31 RX ORDER — NAPROXEN 500 MG/1
500 TABLET ORAL 2 TIMES DAILY WITH MEALS
Qty: 20 TABLET | Refills: 0 | Status: SHIPPED | OUTPATIENT
Start: 2025-07-31

## 2025-07-31 RX ORDER — GABAPENTIN 300 MG/1
CAPSULE ORAL
Qty: 120 CAPSULE | Refills: 2 | Status: SHIPPED | OUTPATIENT
Start: 2025-07-31

## 2025-07-31 RX ORDER — GABAPENTIN 300 MG/1
300 CAPSULE ORAL 3 TIMES DAILY
COMMUNITY
Start: 2025-07-31 | End: 2025-07-31

## 2025-08-01 ENCOUNTER — HOSPITAL ENCOUNTER (OUTPATIENT)
Dept: BEHAVIORAL HEALTH | Facility: CLINIC | Age: 45
Discharge: HOME | End: 2025-08-01
Attending: FAMILY MEDICINE
Payer: COMMERCIAL

## 2025-08-01 PROCEDURE — H2035 A/D TX PROGRAM, PER HOUR: HCPCS | Mod: HQ

## 2025-08-01 PROCEDURE — 1002N00001 HC LODGING PLUS FACILITY CHARGE ADULT

## 2025-08-02 ENCOUNTER — HOSPITAL ENCOUNTER (OUTPATIENT)
Dept: BEHAVIORAL HEALTH | Facility: CLINIC | Age: 45
Discharge: HOME | End: 2025-08-02
Attending: FAMILY MEDICINE
Payer: COMMERCIAL

## 2025-08-02 PROCEDURE — 1002N00001 HC LODGING PLUS FACILITY CHARGE ADULT

## 2025-08-02 PROCEDURE — H2035 A/D TX PROGRAM, PER HOUR: HCPCS | Mod: HQ

## 2025-08-02 ASSESSMENT — COLUMBIA-SUICIDE SEVERITY RATING SCALE - C-SSRS
TOTAL  NUMBER OF INTERRUPTED ATTEMPTS SINCE LAST CONTACT: NO
LETHALITY/MEDICAL DAMAGE CODE MOST LETHAL ACTUAL ATTEMPT: NO PHYSICAL DAMAGE OR VERY MINOR PHYSICAL DAMAGE
ATTEMPT SINCE LAST CONTACT: NO
6. HAVE YOU EVER DONE ANYTHING, STARTED TO DO ANYTHING, OR PREPARED TO DO ANYTHING TO END YOUR LIFE?: NO
TOTAL  NUMBER OF ABORTED OR SELF INTERRUPTED ATTEMPTS SINCE LAST CONTACT: NO
SUICIDE, SINCE LAST CONTACT: NO
REASONS FOR IDEATION SINCE LAST CONTACT: DOES NOT APPLY
1. SINCE LAST CONTACT, HAVE YOU WISHED YOU WERE DEAD OR WISHED YOU COULD GO TO SLEEP AND NOT WAKE UP?: YES
2. HAVE YOU ACTUALLY HAD ANY THOUGHTS OF KILLING YOURSELF?: NO

## 2025-08-03 ENCOUNTER — HOSPITAL ENCOUNTER (OUTPATIENT)
Dept: BEHAVIORAL HEALTH | Facility: CLINIC | Age: 45
Discharge: HOME | End: 2025-08-03
Attending: FAMILY MEDICINE
Payer: COMMERCIAL

## 2025-08-03 PROCEDURE — H2035 A/D TX PROGRAM, PER HOUR: HCPCS | Mod: HQ

## 2025-08-03 PROCEDURE — 1002N00001 HC LODGING PLUS FACILITY CHARGE ADULT

## 2025-08-04 ENCOUNTER — HOSPITAL ENCOUNTER (EMERGENCY)
Facility: CLINIC | Age: 45
Discharge: HOME | End: 2025-08-04
Admitting: PHYSICIAN ASSISTANT
Payer: COMMERCIAL

## 2025-08-04 ENCOUNTER — HOSPITAL ENCOUNTER (OUTPATIENT)
Dept: BEHAVIORAL HEALTH | Facility: CLINIC | Age: 45
Discharge: HOME | End: 2025-08-04
Attending: FAMILY MEDICINE
Payer: COMMERCIAL

## 2025-08-04 ENCOUNTER — HOSPITAL ENCOUNTER (EMERGENCY)
Facility: CLINIC | Age: 45
Discharge: HOME | End: 2025-08-04
Attending: EMERGENCY MEDICINE | Admitting: EMERGENCY MEDICINE
Payer: COMMERCIAL

## 2025-08-04 VITALS
OXYGEN SATURATION: 97 % | RESPIRATION RATE: 14 BRPM | HEART RATE: 70 BPM | BODY MASS INDEX: 40.07 KG/M2 | DIASTOLIC BLOOD PRESSURE: 89 MMHG | SYSTOLIC BLOOD PRESSURE: 169 MMHG | HEIGHT: 66 IN | WEIGHT: 249.3 LBS | TEMPERATURE: 98 F

## 2025-08-04 VITALS
WEIGHT: 285 LBS | RESPIRATION RATE: 16 BRPM | OXYGEN SATURATION: 94 % | TEMPERATURE: 97.7 F | HEART RATE: 66 BPM | SYSTOLIC BLOOD PRESSURE: 135 MMHG | DIASTOLIC BLOOD PRESSURE: 84 MMHG | BODY MASS INDEX: 45.8 KG/M2 | HEIGHT: 66 IN

## 2025-08-04 DIAGNOSIS — S91.301A UNSPECIFIED OPEN WOUND, RIGHT FOOT, INITIAL ENCOUNTER: Primary | ICD-10-CM

## 2025-08-04 DIAGNOSIS — R45.851 SUICIDAL IDEATION: Primary | ICD-10-CM

## 2025-08-04 PROBLEM — F41.9 ANXIETY: Status: ACTIVE | Noted: 2025-08-04

## 2025-08-04 PROCEDURE — 99285 EMERGENCY DEPT VISIT HI MDM: CPT | Performed by: EMERGENCY MEDICINE

## 2025-08-04 PROCEDURE — 99283 EMERGENCY DEPT VISIT LOW MDM: CPT

## 2025-08-04 PROCEDURE — 99284 EMERGENCY DEPT VISIT MOD MDM: CPT | Performed by: EMERGENCY MEDICINE

## 2025-08-04 PROCEDURE — 1002N00001 HC LODGING PLUS FACILITY CHARGE ADULT

## 2025-08-04 PROCEDURE — H2035 A/D TX PROGRAM, PER HOUR: HCPCS | Mod: HQ

## 2025-08-04 PROCEDURE — 99282 EMERGENCY DEPT VISIT SF MDM: CPT | Performed by: EMERGENCY MEDICINE

## 2025-08-04 PROCEDURE — 99283 EMERGENCY DEPT VISIT LOW MDM: CPT | Performed by: PHYSICIAN ASSISTANT

## 2025-08-04 ASSESSMENT — COLUMBIA-SUICIDE SEVERITY RATING SCALE - C-SSRS
2. HAVE YOU ACTUALLY HAD ANY THOUGHTS OF KILLING YOURSELF IN THE PAST MONTH?: YES
4. HAVE YOU HAD THESE THOUGHTS AND HAD SOME INTENTION OF ACTING ON THEM?: NO
1. IN THE PAST MONTH, HAVE YOU WISHED YOU WERE DEAD OR WISHED YOU COULD GO TO SLEEP AND NOT WAKE UP?: YES
5. HAVE YOU STARTED TO WORK OUT OR WORKED OUT THE DETAILS OF HOW TO KILL YOURSELF? DO YOU INTEND TO CARRY OUT THIS PLAN?: YES
3. HAVE YOU BEEN THINKING ABOUT HOW YOU MIGHT KILL YOURSELF?: YES
6. HAVE YOU EVER DONE ANYTHING, STARTED TO DO ANYTHING, OR PREPARED TO DO ANYTHING TO END YOUR LIFE?: YES

## 2025-08-04 ASSESSMENT — ACTIVITIES OF DAILY LIVING (ADL)
ADLS_ACUITY_SCORE: 41

## 2025-08-05 ENCOUNTER — PATIENT OUTREACH (OUTPATIENT)
Dept: CARE COORDINATION | Facility: CLINIC | Age: 45
End: 2025-08-05
Payer: COMMERCIAL

## 2025-08-05 ENCOUNTER — HOSPITAL ENCOUNTER (OUTPATIENT)
Dept: BEHAVIORAL HEALTH | Facility: CLINIC | Age: 45
Discharge: HOME | End: 2025-08-05
Attending: FAMILY MEDICINE
Payer: COMMERCIAL

## 2025-08-05 PROCEDURE — H2035 A/D TX PROGRAM, PER HOUR: HCPCS | Mod: HQ

## 2025-08-05 PROCEDURE — H2035 A/D TX PROGRAM, PER HOUR: HCPCS | Mod: HQ | Performed by: COUNSELOR

## 2025-08-05 PROCEDURE — 1002N00001 HC LODGING PLUS FACILITY CHARGE ADULT

## 2025-08-06 ENCOUNTER — HOSPITAL ENCOUNTER (OUTPATIENT)
Dept: BEHAVIORAL HEALTH | Facility: CLINIC | Age: 45
Discharge: HOME | End: 2025-08-06
Attending: FAMILY MEDICINE
Payer: COMMERCIAL

## 2025-08-06 PROCEDURE — H2035 A/D TX PROGRAM, PER HOUR: HCPCS | Mod: HQ

## 2025-08-06 PROCEDURE — 1002N00001 HC LODGING PLUS FACILITY CHARGE ADULT

## 2025-08-08 ENCOUNTER — APPOINTMENT (OUTPATIENT)
Dept: GENERAL RADIOLOGY | Facility: CLINIC | Age: 45
End: 2025-08-08
Attending: STUDENT IN AN ORGANIZED HEALTH CARE EDUCATION/TRAINING PROGRAM
Payer: COMMERCIAL

## 2025-08-08 ENCOUNTER — HOSPITAL ENCOUNTER (OUTPATIENT)
Facility: CLINIC | Age: 45
Setting detail: OBSERVATION
Discharge: HOME | End: 2025-08-13
Attending: STUDENT IN AN ORGANIZED HEALTH CARE EDUCATION/TRAINING PROGRAM | Admitting: STUDENT IN AN ORGANIZED HEALTH CARE EDUCATION/TRAINING PROGRAM
Payer: COMMERCIAL

## 2025-08-08 DIAGNOSIS — R45.851 SUICIDAL IDEATION: Primary | ICD-10-CM

## 2025-08-08 DIAGNOSIS — J45.31 MILD PERSISTENT ASTHMA WITH EXACERBATION: ICD-10-CM

## 2025-08-08 DIAGNOSIS — L97.512 CHRONIC ULCER OF RIGHT FOOT WITH FAT LAYER EXPOSED (H): ICD-10-CM

## 2025-08-08 DIAGNOSIS — Z71.89 OTHER SPECIFIED COUNSELING: Chronic | ICD-10-CM

## 2025-08-08 DIAGNOSIS — R44.0 AUDITORY HALLUCINATION: ICD-10-CM

## 2025-08-08 PROBLEM — F43.10 PTSD (POST-TRAUMATIC STRESS DISORDER): Chronic | Status: ACTIVE | Noted: 2025-08-08

## 2025-08-08 LAB
ANION GAP SERPL CALCULATED.3IONS-SCNC: 13 MMOL/L (ref 7–15)
BASOPHILS # BLD AUTO: 0 10E3/UL (ref 0–0.2)
BASOPHILS NFR BLD AUTO: 0 %
BUN SERPL-MCNC: 16.5 MG/DL (ref 6–20)
CALCIUM SERPL-MCNC: 8.4 MG/DL (ref 8.8–10.4)
CHLORIDE SERPL-SCNC: 107 MMOL/L (ref 98–107)
CREAT SERPL-MCNC: 0.93 MG/DL (ref 0.51–0.95)
EGFRCR SERPLBLD CKD-EPI 2021: 76 ML/MIN/1.73M2
EOSINOPHIL # BLD AUTO: 0.4 10E3/UL (ref 0–0.7)
EOSINOPHIL NFR BLD AUTO: 5 %
ERYTHROCYTE [DISTWIDTH] IN BLOOD BY AUTOMATED COUNT: 13.2 % (ref 10–15)
GLUCOSE SERPL-MCNC: 123 MG/DL (ref 70–99)
HCO3 SERPL-SCNC: 20 MMOL/L (ref 22–29)
HCT VFR BLD AUTO: 39.4 % (ref 35–47)
HGB BLD-MCNC: 13.5 G/DL (ref 11.7–15.7)
IMM GRANULOCYTES # BLD: 0 10E3/UL
IMM GRANULOCYTES NFR BLD: 0 %
LYMPHOCYTES # BLD AUTO: 1.4 10E3/UL (ref 0.8–5.3)
LYMPHOCYTES NFR BLD AUTO: 20 %
MCH RBC QN AUTO: 30.2 PG (ref 26.5–33)
MCHC RBC AUTO-ENTMCNC: 34.3 G/DL (ref 31.5–36.5)
MCV RBC AUTO: 88 FL (ref 78–100)
MONOCYTES # BLD AUTO: 0.6 10E3/UL (ref 0–1.3)
MONOCYTES NFR BLD AUTO: 8 %
NEUTROPHILS # BLD AUTO: 4.5 10E3/UL (ref 1.6–8.3)
NEUTROPHILS NFR BLD AUTO: 66 %
NRBC # BLD AUTO: 0 10E3/UL
NRBC BLD AUTO-RTO: 0 /100
PLATELET # BLD AUTO: 268 10E3/UL (ref 150–450)
POTASSIUM SERPL-SCNC: 3.7 MMOL/L (ref 3.4–5.3)
RBC # BLD AUTO: 4.47 10E6/UL (ref 3.8–5.2)
SODIUM SERPL-SCNC: 140 MMOL/L (ref 135–145)
WBC # BLD AUTO: 6.8 10E3/UL (ref 4–11)

## 2025-08-08 PROCEDURE — G0378 HOSPITAL OBSERVATION PER HR: HCPCS

## 2025-08-08 PROCEDURE — 250N000013 HC RX MED GY IP 250 OP 250 PS 637: Performed by: STUDENT IN AN ORGANIZED HEALTH CARE EDUCATION/TRAINING PROGRAM

## 2025-08-08 PROCEDURE — 80048 BASIC METABOLIC PNL TOTAL CA: CPT | Performed by: STUDENT IN AN ORGANIZED HEALTH CARE EDUCATION/TRAINING PROGRAM

## 2025-08-08 PROCEDURE — 99285 EMERGENCY DEPT VISIT HI MDM: CPT | Performed by: STUDENT IN AN ORGANIZED HEALTH CARE EDUCATION/TRAINING PROGRAM

## 2025-08-08 PROCEDURE — 99285 EMERGENCY DEPT VISIT HI MDM: CPT | Mod: 25 | Performed by: STUDENT IN AN ORGANIZED HEALTH CARE EDUCATION/TRAINING PROGRAM

## 2025-08-08 PROCEDURE — 71046 X-RAY EXAM CHEST 2 VIEWS: CPT

## 2025-08-08 PROCEDURE — 99284 EMERGENCY DEPT VISIT MOD MDM: CPT | Mod: 25 | Performed by: STUDENT IN AN ORGANIZED HEALTH CARE EDUCATION/TRAINING PROGRAM

## 2025-08-08 PROCEDURE — 85025 COMPLETE CBC W/AUTO DIFF WBC: CPT | Performed by: STUDENT IN AN ORGANIZED HEALTH CARE EDUCATION/TRAINING PROGRAM

## 2025-08-08 PROCEDURE — 36415 COLL VENOUS BLD VENIPUNCTURE: CPT | Performed by: STUDENT IN AN ORGANIZED HEALTH CARE EDUCATION/TRAINING PROGRAM

## 2025-08-08 RX ORDER — ALBUTEROL SULFATE 90 UG/1
2 INHALANT RESPIRATORY (INHALATION) EVERY 6 HOURS PRN
Status: DISCONTINUED | OUTPATIENT
Start: 2025-08-08 | End: 2025-08-08

## 2025-08-08 RX ORDER — GABAPENTIN 300 MG/1
600 CAPSULE ORAL AT BEDTIME
Status: DISCONTINUED | OUTPATIENT
Start: 2025-08-08 | End: 2025-08-13 | Stop reason: HOSPADM

## 2025-08-08 RX ORDER — LAMOTRIGINE 25 MG/1
25 TABLET ORAL DAILY
Status: DISCONTINUED | OUTPATIENT
Start: 2025-08-09 | End: 2025-08-11

## 2025-08-08 RX ORDER — ONDANSETRON 4 MG/1
4 TABLET, ORALLY DISINTEGRATING ORAL EVERY 8 HOURS PRN
Status: DISCONTINUED | OUTPATIENT
Start: 2025-08-08 | End: 2025-08-13 | Stop reason: HOSPADM

## 2025-08-08 RX ORDER — ALBUTEROL SULFATE 90 UG/1
4 INHALANT RESPIRATORY (INHALATION) EVERY 4 HOURS PRN
Status: DISCONTINUED | OUTPATIENT
Start: 2025-08-08 | End: 2025-08-13 | Stop reason: HOSPADM

## 2025-08-08 RX ORDER — LURASIDONE HYDROCHLORIDE 40 MG/1
40 TABLET, FILM COATED ORAL DAILY
Status: DISCONTINUED | OUTPATIENT
Start: 2025-08-09 | End: 2025-08-11

## 2025-08-08 RX ORDER — CLONIDINE HYDROCHLORIDE 0.1 MG/1
0.1 TABLET ORAL DAILY PRN
Status: DISCONTINUED | OUTPATIENT
Start: 2025-08-08 | End: 2025-08-13 | Stop reason: HOSPADM

## 2025-08-08 RX ORDER — NICOTINE 21 MG/24HR
1 PATCH, TRANSDERMAL 24 HOURS TRANSDERMAL EVERY 24 HOURS
Status: DISCONTINUED | OUTPATIENT
Start: 2025-08-08 | End: 2025-08-13 | Stop reason: HOSPADM

## 2025-08-08 RX ORDER — GABAPENTIN 300 MG/1
300 CAPSULE ORAL
Status: DISCONTINUED | OUTPATIENT
Start: 2025-08-09 | End: 2025-08-13 | Stop reason: HOSPADM

## 2025-08-08 RX ORDER — NAPROXEN 500 MG/1
500 TABLET ORAL 2 TIMES DAILY WITH MEALS
Status: DISCONTINUED | OUTPATIENT
Start: 2025-08-09 | End: 2025-08-13 | Stop reason: HOSPADM

## 2025-08-08 RX ORDER — GABAPENTIN 300 MG/1
300 CAPSULE ORAL EVERY MORNING
Status: DISCONTINUED | OUTPATIENT
Start: 2025-08-09 | End: 2025-08-13 | Stop reason: HOSPADM

## 2025-08-08 RX ADMIN — NICOTINE 1 PATCH: 21 PATCH, EXTENDED RELEASE TRANSDERMAL at 23:16

## 2025-08-08 RX ADMIN — GABAPENTIN 600 MG: 300 CAPSULE ORAL at 23:17

## 2025-08-08 RX ADMIN — CLONIDINE HYDROCHLORIDE 0.1 MG: 0.1 TABLET ORAL at 23:17

## 2025-08-08 ASSESSMENT — COLUMBIA-SUICIDE SEVERITY RATING SCALE - C-SSRS
5. HAVE YOU STARTED TO WORK OUT OR WORKED OUT THE DETAILS OF HOW TO KILL YOURSELF? DO YOU INTEND TO CARRY OUT THIS PLAN?: NO
6. HAVE YOU EVER DONE ANYTHING, STARTED TO DO ANYTHING, OR PREPARED TO DO ANYTHING TO END YOUR LIFE?: YES
3. HAVE YOU BEEN THINKING ABOUT HOW YOU MIGHT KILL YOURSELF?: YES
1. IN THE PAST MONTH, HAVE YOU WISHED YOU WERE DEAD OR WISHED YOU COULD GO TO SLEEP AND NOT WAKE UP?: YES
2. HAVE YOU ACTUALLY HAD ANY THOUGHTS OF KILLING YOURSELF IN THE PAST MONTH?: YES
4. HAVE YOU HAD THESE THOUGHTS AND HAD SOME INTENTION OF ACTING ON THEM?: NO

## 2025-08-08 ASSESSMENT — ACTIVITIES OF DAILY LIVING (ADL)
ADLS_ACUITY_SCORE: 41

## 2025-08-09 LAB
AMPHETAMINES UR QL SCN: NORMAL
BARBITURATES UR QL SCN: NORMAL
BENZODIAZ UR QL SCN: NORMAL
BZE UR QL SCN: NORMAL
CANNABINOIDS UR QL SCN: NORMAL
FENTANYL UR QL: NORMAL
OPIATES UR QL SCN: NORMAL
PCP QUAL URINE (ROCHE): NORMAL

## 2025-08-09 PROCEDURE — 250N000013 HC RX MED GY IP 250 OP 250 PS 637: Performed by: PHYSICIAN ASSISTANT

## 2025-08-09 PROCEDURE — 250N000012 HC RX MED GY IP 250 OP 636 PS 637: Performed by: STUDENT IN AN ORGANIZED HEALTH CARE EDUCATION/TRAINING PROGRAM

## 2025-08-09 PROCEDURE — 80307 DRUG TEST PRSMV CHEM ANLYZR: CPT | Performed by: STUDENT IN AN ORGANIZED HEALTH CARE EDUCATION/TRAINING PROGRAM

## 2025-08-09 PROCEDURE — 99255 IP/OBS CONSLTJ NEW/EST HI 80: CPT

## 2025-08-09 PROCEDURE — G0378 HOSPITAL OBSERVATION PER HR: HCPCS

## 2025-08-09 PROCEDURE — 250N000013 HC RX MED GY IP 250 OP 250 PS 637: Performed by: STUDENT IN AN ORGANIZED HEALTH CARE EDUCATION/TRAINING PROGRAM

## 2025-08-09 PROCEDURE — 99232 SBSQ HOSP IP/OBS MODERATE 35: CPT | Performed by: PHYSICIAN ASSISTANT

## 2025-08-09 PROCEDURE — 250N000013 HC RX MED GY IP 250 OP 250 PS 637: Performed by: EMERGENCY MEDICINE

## 2025-08-09 RX ORDER — ACETAMINOPHEN 500 MG
1000 TABLET ORAL ONCE
Status: COMPLETED | OUTPATIENT
Start: 2025-08-09 | End: 2025-08-09

## 2025-08-09 RX ORDER — PREDNISONE 20 MG/1
40 TABLET ORAL DAILY
Status: COMPLETED | OUTPATIENT
Start: 2025-08-09 | End: 2025-08-13

## 2025-08-09 RX ORDER — PREDNISONE 20 MG/1
40 TABLET ORAL ONCE
Status: DISCONTINUED | OUTPATIENT
Start: 2025-08-09 | End: 2025-08-09

## 2025-08-09 RX ADMIN — PREDNISONE 40 MG: 20 TABLET ORAL at 08:04

## 2025-08-09 RX ADMIN — GABAPENTIN 300 MG: 300 CAPSULE ORAL at 13:44

## 2025-08-09 RX ADMIN — LURASIDONE HYDROCHLORIDE 40 MG: 40 TABLET, FILM COATED ORAL at 08:05

## 2025-08-09 RX ADMIN — GABAPENTIN 600 MG: 300 CAPSULE ORAL at 21:28

## 2025-08-09 RX ADMIN — CLONIDINE HYDROCHLORIDE 0.1 MG: 0.1 TABLET ORAL at 21:45

## 2025-08-09 RX ADMIN — GABAPENTIN 300 MG: 300 CAPSULE ORAL at 08:04

## 2025-08-09 RX ADMIN — NAPROXEN 500 MG: 500 TABLET ORAL at 08:04

## 2025-08-09 RX ADMIN — LAMOTRIGINE 25 MG: 25 TABLET ORAL at 08:05

## 2025-08-09 RX ADMIN — NAPROXEN 500 MG: 500 TABLET ORAL at 21:28

## 2025-08-09 RX ADMIN — NICOTINE 1 PATCH: 21 PATCH, EXTENDED RELEASE TRANSDERMAL at 21:46

## 2025-08-09 RX ADMIN — Medication 5 MG: at 22:20

## 2025-08-09 RX ADMIN — ACETAMINOPHEN 1000 MG: 500 TABLET ORAL at 15:28

## 2025-08-09 ASSESSMENT — ACTIVITIES OF DAILY LIVING (ADL)
ADLS_ACUITY_SCORE: 41

## 2025-08-10 PROCEDURE — 99231 SBSQ HOSP IP/OBS SF/LOW 25: CPT | Performed by: PHYSICIAN ASSISTANT

## 2025-08-10 PROCEDURE — 250N000013 HC RX MED GY IP 250 OP 250 PS 637: Performed by: EMERGENCY MEDICINE

## 2025-08-10 PROCEDURE — 250N000013 HC RX MED GY IP 250 OP 250 PS 637: Performed by: PHYSICIAN ASSISTANT

## 2025-08-10 PROCEDURE — 250N000012 HC RX MED GY IP 250 OP 636 PS 637: Performed by: STUDENT IN AN ORGANIZED HEALTH CARE EDUCATION/TRAINING PROGRAM

## 2025-08-10 PROCEDURE — 250N000011 HC RX IP 250 OP 636: Performed by: STUDENT IN AN ORGANIZED HEALTH CARE EDUCATION/TRAINING PROGRAM

## 2025-08-10 PROCEDURE — 250N000013 HC RX MED GY IP 250 OP 250 PS 637: Performed by: FAMILY MEDICINE

## 2025-08-10 PROCEDURE — 250N000013 HC RX MED GY IP 250 OP 250 PS 637: Performed by: STUDENT IN AN ORGANIZED HEALTH CARE EDUCATION/TRAINING PROGRAM

## 2025-08-10 PROCEDURE — G0378 HOSPITAL OBSERVATION PER HR: HCPCS

## 2025-08-10 RX ORDER — CLONIDINE HYDROCHLORIDE 0.1 MG/1
0.3 TABLET ORAL AT BEDTIME
Status: DISCONTINUED | OUTPATIENT
Start: 2025-08-10 | End: 2025-08-10

## 2025-08-10 RX ORDER — ACETAMINOPHEN 500 MG
1000 TABLET ORAL ONCE
Status: COMPLETED | OUTPATIENT
Start: 2025-08-10 | End: 2025-08-10

## 2025-08-10 RX ORDER — HYDROXYZINE HYDROCHLORIDE 25 MG/1
25-50 TABLET, FILM COATED ORAL EVERY 6 HOURS PRN
Status: DISCONTINUED | OUTPATIENT
Start: 2025-08-10 | End: 2025-08-13 | Stop reason: HOSPADM

## 2025-08-10 RX ORDER — CLONIDINE HYDROCHLORIDE 0.1 MG/1
0.2 TABLET ORAL AT BEDTIME
Status: DISCONTINUED | OUTPATIENT
Start: 2025-08-10 | End: 2025-08-13 | Stop reason: HOSPADM

## 2025-08-10 RX ADMIN — GABAPENTIN 300 MG: 300 CAPSULE ORAL at 12:18

## 2025-08-10 RX ADMIN — PREDNISONE 40 MG: 20 TABLET ORAL at 08:02

## 2025-08-10 RX ADMIN — NICOTINE POLACRILEX 4 MG: 4 GUM, CHEWING BUCCAL at 22:12

## 2025-08-10 RX ADMIN — NICOTINE 1 PATCH: 21 PATCH, EXTENDED RELEASE TRANSDERMAL at 22:12

## 2025-08-10 RX ADMIN — CLONIDINE HYDROCHLORIDE 0.2 MG: 0.1 TABLET ORAL at 21:08

## 2025-08-10 RX ADMIN — GABAPENTIN 600 MG: 300 CAPSULE ORAL at 21:07

## 2025-08-10 RX ADMIN — HYDROXYZINE HYDROCHLORIDE 50 MG: 25 TABLET ORAL at 15:54

## 2025-08-10 RX ADMIN — HYDROXYZINE HYDROCHLORIDE 50 MG: 25 TABLET ORAL at 22:12

## 2025-08-10 RX ADMIN — Medication 5 MG: at 22:12

## 2025-08-10 RX ADMIN — LURASIDONE HYDROCHLORIDE 40 MG: 40 TABLET, FILM COATED ORAL at 08:19

## 2025-08-10 RX ADMIN — NAPROXEN 500 MG: 500 TABLET ORAL at 17:55

## 2025-08-10 RX ADMIN — NAPROXEN 500 MG: 500 TABLET ORAL at 08:02

## 2025-08-10 RX ADMIN — GABAPENTIN 300 MG: 300 CAPSULE ORAL at 08:02

## 2025-08-10 RX ADMIN — ACETAMINOPHEN 1000 MG: 500 TABLET ORAL at 13:18

## 2025-08-10 RX ADMIN — CLONIDINE HYDROCHLORIDE 0.1 MG: 0.1 TABLET ORAL at 09:01

## 2025-08-10 RX ADMIN — ONDANSETRON 4 MG: 4 TABLET, ORALLY DISINTEGRATING ORAL at 09:01

## 2025-08-10 ASSESSMENT — ACTIVITIES OF DAILY LIVING (ADL)
ADLS_ACUITY_SCORE: 41

## 2025-08-11 ENCOUNTER — TELEPHONE (OUTPATIENT)
Facility: CLINIC | Age: 45
End: 2025-08-11
Payer: COMMERCIAL

## 2025-08-11 PROCEDURE — 250N000013 HC RX MED GY IP 250 OP 250 PS 637: Performed by: FAMILY MEDICINE

## 2025-08-11 PROCEDURE — 99418 PROLNG IP/OBS E/M EA 15 MIN: CPT | Performed by: NURSE PRACTITIONER

## 2025-08-11 PROCEDURE — 250N000012 HC RX MED GY IP 250 OP 636 PS 637: Performed by: STUDENT IN AN ORGANIZED HEALTH CARE EDUCATION/TRAINING PROGRAM

## 2025-08-11 PROCEDURE — 250N000013 HC RX MED GY IP 250 OP 250 PS 637: Performed by: EMERGENCY MEDICINE

## 2025-08-11 PROCEDURE — G0378 HOSPITAL OBSERVATION PER HR: HCPCS

## 2025-08-11 PROCEDURE — 99232 SBSQ HOSP IP/OBS MODERATE 35: CPT | Performed by: PHYSICIAN ASSISTANT

## 2025-08-11 PROCEDURE — 99255 IP/OBS CONSLTJ NEW/EST HI 80: CPT | Performed by: NURSE PRACTITIONER

## 2025-08-11 PROCEDURE — 250N000013 HC RX MED GY IP 250 OP 250 PS 637: Performed by: STUDENT IN AN ORGANIZED HEALTH CARE EDUCATION/TRAINING PROGRAM

## 2025-08-11 RX ADMIN — Medication 5 MG: at 22:19

## 2025-08-11 RX ADMIN — PREDNISONE 40 MG: 20 TABLET ORAL at 08:09

## 2025-08-11 RX ADMIN — HYDROXYZINE HYDROCHLORIDE 50 MG: 25 TABLET ORAL at 13:48

## 2025-08-11 RX ADMIN — NICOTINE POLACRILEX 4 MG: 4 GUM, CHEWING BUCCAL at 08:22

## 2025-08-11 RX ADMIN — NICOTINE 1 PATCH: 21 PATCH, EXTENDED RELEASE TRANSDERMAL at 22:20

## 2025-08-11 RX ADMIN — NICOTINE POLACRILEX 4 MG: 4 GUM, CHEWING BUCCAL at 05:40

## 2025-08-11 RX ADMIN — GABAPENTIN 600 MG: 300 CAPSULE ORAL at 21:56

## 2025-08-11 RX ADMIN — GABAPENTIN 300 MG: 300 CAPSULE ORAL at 08:06

## 2025-08-11 RX ADMIN — LURASIDONE HYDROCHLORIDE 40 MG: 40 TABLET, FILM COATED ORAL at 08:08

## 2025-08-11 RX ADMIN — GABAPENTIN 300 MG: 300 CAPSULE ORAL at 13:24

## 2025-08-11 RX ADMIN — NICOTINE POLACRILEX 4 MG: 4 GUM, CHEWING BUCCAL at 17:07

## 2025-08-11 RX ADMIN — CLONIDINE HYDROCHLORIDE 0.2 MG: 0.1 TABLET ORAL at 21:57

## 2025-08-11 RX ADMIN — HYDROXYZINE HYDROCHLORIDE 50 MG: 25 TABLET ORAL at 18:52

## 2025-08-11 RX ADMIN — NAPROXEN 500 MG: 500 TABLET ORAL at 08:09

## 2025-08-11 RX ADMIN — CLONIDINE HYDROCHLORIDE 0.1 MG: 0.1 TABLET ORAL at 17:07

## 2025-08-11 RX ADMIN — NAPROXEN 500 MG: 500 TABLET ORAL at 18:51

## 2025-08-11 ASSESSMENT — COLUMBIA-SUICIDE SEVERITY RATING SCALE - C-SSRS
SUICIDE, SINCE LAST CONTACT: NO
6. HAVE YOU EVER DONE ANYTHING, STARTED TO DO ANYTHING, OR PREPARED TO DO ANYTHING TO END YOUR LIFE?: NO
TOTAL  NUMBER OF ABORTED OR SELF INTERRUPTED ATTEMPTS SINCE LAST CONTACT: NO
5. HAVE YOU STARTED TO WORK OUT OR WORKED OUT THE DETAILS OF HOW TO KILL YOURSELF? DO YOU INTEND TO CARRY OUT THIS PLAN?: NO
1. SINCE LAST CONTACT, HAVE YOU WISHED YOU WERE DEAD OR WISHED YOU COULD GO TO SLEEP AND NOT WAKE UP?: NO
ATTEMPT SINCE LAST CONTACT: NO
2. HAVE YOU ACTUALLY HAD ANY THOUGHTS OF KILLING YOURSELF?: YES
TOTAL  NUMBER OF INTERRUPTED ATTEMPTS SINCE LAST CONTACT: NO

## 2025-08-11 ASSESSMENT — ACTIVITIES OF DAILY LIVING (ADL)
ADLS_ACUITY_SCORE: 41

## 2025-08-12 PROCEDURE — 250N000013 HC RX MED GY IP 250 OP 250 PS 637: Performed by: STUDENT IN AN ORGANIZED HEALTH CARE EDUCATION/TRAINING PROGRAM

## 2025-08-12 PROCEDURE — 99207 PR NO CHARGE LOS: CPT | Performed by: PSYCHIATRY & NEUROLOGY

## 2025-08-12 PROCEDURE — 250N000013 HC RX MED GY IP 250 OP 250 PS 637: Performed by: EMERGENCY MEDICINE

## 2025-08-12 PROCEDURE — 250N000013 HC RX MED GY IP 250 OP 250 PS 637: Performed by: FAMILY MEDICINE

## 2025-08-12 PROCEDURE — 250N000013 HC RX MED GY IP 250 OP 250 PS 637: Performed by: NURSE PRACTITIONER

## 2025-08-12 PROCEDURE — 99232 SBSQ HOSP IP/OBS MODERATE 35: CPT | Performed by: FAMILY MEDICINE

## 2025-08-12 PROCEDURE — 250N000012 HC RX MED GY IP 250 OP 636 PS 637: Performed by: STUDENT IN AN ORGANIZED HEALTH CARE EDUCATION/TRAINING PROGRAM

## 2025-08-12 PROCEDURE — G0378 HOSPITAL OBSERVATION PER HR: HCPCS

## 2025-08-12 RX ADMIN — GABAPENTIN 300 MG: 300 CAPSULE ORAL at 13:09

## 2025-08-12 RX ADMIN — NAPROXEN 500 MG: 500 TABLET ORAL at 18:19

## 2025-08-12 RX ADMIN — GABAPENTIN 600 MG: 300 CAPSULE ORAL at 21:08

## 2025-08-12 RX ADMIN — Medication 5 MG: at 21:08

## 2025-08-12 RX ADMIN — CLONIDINE HYDROCHLORIDE 0.2 MG: 0.1 TABLET ORAL at 21:07

## 2025-08-12 RX ADMIN — NICOTINE POLACRILEX 4 MG: 4 GUM, CHEWING BUCCAL at 09:31

## 2025-08-12 RX ADMIN — NICOTINE POLACRILEX 4 MG: 4 LOZENGE ORAL at 20:12

## 2025-08-12 RX ADMIN — GABAPENTIN 300 MG: 300 CAPSULE ORAL at 09:13

## 2025-08-12 RX ADMIN — LUMATEPERONE 42 MG: 42 CAPSULE ORAL at 09:13

## 2025-08-12 RX ADMIN — NICOTINE 1 PATCH: 21 PATCH, EXTENDED RELEASE TRANSDERMAL at 21:09

## 2025-08-12 RX ADMIN — HYDROXYZINE HYDROCHLORIDE 50 MG: 25 TABLET ORAL at 20:17

## 2025-08-12 RX ADMIN — NAPROXEN 500 MG: 500 TABLET ORAL at 09:13

## 2025-08-12 RX ADMIN — PREDNISONE 40 MG: 20 TABLET ORAL at 09:13

## 2025-08-12 ASSESSMENT — ACTIVITIES OF DAILY LIVING (ADL)
ADLS_ACUITY_SCORE: 41

## 2025-08-13 ENCOUNTER — TELEPHONE (OUTPATIENT)
Dept: BEHAVIORAL HEALTH | Facility: CLINIC | Age: 45
End: 2025-08-13
Payer: COMMERCIAL

## 2025-08-13 VITALS
WEIGHT: 285 LBS | HEIGHT: 66 IN | BODY MASS INDEX: 45.8 KG/M2 | OXYGEN SATURATION: 98 % | HEART RATE: 57 BPM | DIASTOLIC BLOOD PRESSURE: 82 MMHG | RESPIRATION RATE: 18 BRPM | TEMPERATURE: 98.2 F | SYSTOLIC BLOOD PRESSURE: 132 MMHG

## 2025-08-13 PROCEDURE — G0378 HOSPITAL OBSERVATION PER HR: HCPCS

## 2025-08-13 PROCEDURE — 250N000013 HC RX MED GY IP 250 OP 250 PS 637: Performed by: FAMILY MEDICINE

## 2025-08-13 PROCEDURE — 250N000012 HC RX MED GY IP 250 OP 636 PS 637: Performed by: STUDENT IN AN ORGANIZED HEALTH CARE EDUCATION/TRAINING PROGRAM

## 2025-08-13 PROCEDURE — 250N000013 HC RX MED GY IP 250 OP 250 PS 637: Performed by: STUDENT IN AN ORGANIZED HEALTH CARE EDUCATION/TRAINING PROGRAM

## 2025-08-13 PROCEDURE — 250N000013 HC RX MED GY IP 250 OP 250 PS 637: Performed by: NURSE PRACTITIONER

## 2025-08-13 RX ORDER — ONDANSETRON 4 MG/1
4 TABLET, ORALLY DISINTEGRATING ORAL EVERY 8 HOURS PRN
Qty: 12 TABLET | Refills: 0 | Status: SHIPPED | OUTPATIENT
Start: 2025-08-13

## 2025-08-13 RX ORDER — PREDNISONE 20 MG/1
40 TABLET ORAL DAILY
Qty: 2 TABLET | Refills: 0 | Status: SHIPPED | OUTPATIENT
Start: 2025-08-14 | End: 2025-08-15

## 2025-08-13 RX ORDER — ALBUTEROL SULFATE 90 UG/1
2 INHALANT RESPIRATORY (INHALATION) EVERY 6 HOURS PRN
Qty: 18 G | Refills: 0 | Status: SHIPPED | OUTPATIENT
Start: 2025-08-13

## 2025-08-13 RX ORDER — CLONIDINE HYDROCHLORIDE 0.2 MG/1
0.2 TABLET ORAL AT BEDTIME
Qty: 7 TABLET | Refills: 0 | Status: SHIPPED | OUTPATIENT
Start: 2025-08-13 | End: 2025-08-20

## 2025-08-13 RX ORDER — LAMOTRIGINE 25 MG/1
25 TABLET ORAL DAILY
Qty: 10 TABLET | Refills: 0 | Status: SHIPPED | OUTPATIENT
Start: 2025-08-13 | End: 2025-08-23

## 2025-08-13 RX ORDER — HYDROXYZINE HYDROCHLORIDE 25 MG/1
25 TABLET, FILM COATED ORAL 3 TIMES DAILY PRN
Qty: 15 TABLET | Refills: 0 | Status: SHIPPED | OUTPATIENT
Start: 2025-08-13

## 2025-08-13 RX ORDER — LURASIDONE HYDROCHLORIDE 40 MG/1
40 TABLET, FILM COATED ORAL
Qty: 10 TABLET | Refills: 0 | Status: SHIPPED | OUTPATIENT
Start: 2025-08-13 | End: 2025-08-23

## 2025-08-13 RX ORDER — GABAPENTIN 300 MG/1
300 CAPSULE ORAL 3 TIMES DAILY
Qty: 30 CAPSULE | Refills: 0 | Status: SHIPPED | OUTPATIENT
Start: 2025-08-13 | End: 2025-08-23

## 2025-08-13 RX ADMIN — NAPROXEN 500 MG: 500 TABLET ORAL at 08:29

## 2025-08-13 RX ADMIN — GABAPENTIN 300 MG: 300 CAPSULE ORAL at 12:58

## 2025-08-13 RX ADMIN — GABAPENTIN 300 MG: 300 CAPSULE ORAL at 08:28

## 2025-08-13 RX ADMIN — HYDROXYZINE HYDROCHLORIDE 50 MG: 25 TABLET ORAL at 02:19

## 2025-08-13 RX ADMIN — PREDNISONE 40 MG: 20 TABLET ORAL at 08:28

## 2025-08-13 RX ADMIN — LUMATEPERONE 42 MG: 42 CAPSULE ORAL at 08:28

## 2025-08-13 ASSESSMENT — COLUMBIA-SUICIDE SEVERITY RATING SCALE - C-SSRS
ATTEMPT SINCE LAST CONTACT: NO
TOTAL  NUMBER OF ABORTED OR SELF INTERRUPTED ATTEMPTS SINCE LAST CONTACT: NO
1. SINCE LAST CONTACT, HAVE YOU WISHED YOU WERE DEAD OR WISHED YOU COULD GO TO SLEEP AND NOT WAKE UP?: YES
5. HAVE YOU STARTED TO WORK OUT OR WORKED OUT THE DETAILS OF HOW TO KILL YOURSELF? DO YOU INTEND TO CARRY OUT THIS PLAN?: NO
2. HAVE YOU ACTUALLY HAD ANY THOUGHTS OF KILLING YOURSELF?: YES
TOTAL  NUMBER OF INTERRUPTED ATTEMPTS SINCE LAST CONTACT: NO
MOST LETHAL DATE: 65745
SUICIDE, SINCE LAST CONTACT: NO
6. HAVE YOU EVER DONE ANYTHING, STARTED TO DO ANYTHING, OR PREPARED TO DO ANYTHING TO END YOUR LIFE?: NO
REASONS FOR IDEATION SINCE LAST CONTACT: COMPLETELY TO END OR STOP THE PAIN (YOU COULDN'T GO ON LIVING WITH THE PAIN OR HOW YOU WERE FEELING)

## 2025-08-13 ASSESSMENT — ACTIVITIES OF DAILY LIVING (ADL)
ADLS_ACUITY_SCORE: 41

## 2025-08-14 ENCOUNTER — PATIENT OUTREACH (OUTPATIENT)
Dept: CARE COORDINATION | Facility: CLINIC | Age: 45
End: 2025-08-14
Payer: COMMERCIAL

## 2025-08-14 ENCOUNTER — TELEPHONE (OUTPATIENT)
Dept: PSYCHIATRY | Facility: CLINIC | Age: 45
End: 2025-08-14
Payer: COMMERCIAL

## 2025-08-21 ENCOUNTER — DOCUMENTATION ONLY (OUTPATIENT)
Dept: ORTHOPEDICS | Facility: CLINIC | Age: 45
End: 2025-08-21
Payer: COMMERCIAL

## 2025-08-21 ENCOUNTER — DOCUMENTATION ONLY (OUTPATIENT)
Dept: RHEUMATOLOGY | Facility: CLINIC | Age: 45
End: 2025-08-21
Payer: COMMERCIAL

## 2025-08-21 DIAGNOSIS — R26.89 IMPAIRED GAIT AND MOBILITY: Primary | ICD-10-CM

## 2025-08-21 DIAGNOSIS — M17.12 PRIMARY OSTEOARTHRITIS OF LEFT KNEE: ICD-10-CM

## 2025-08-21 DIAGNOSIS — Z91.199 NO-SHOW FOR APPOINTMENT: Primary | ICD-10-CM

## 2025-08-21 DIAGNOSIS — M17.12 PRIMARY OSTEOARTHRITIS OF LEFT KNEE: Primary | ICD-10-CM

## (undated) RX ORDER — LIDOCAINE HYDROCHLORIDE 10 MG/ML
INJECTION, SOLUTION EPIDURAL; INFILTRATION; INTRACAUDAL; PERINEURAL
Status: DISPENSED
Start: 2025-05-06

## (undated) RX ORDER — TRIAMCINOLONE ACETONIDE 40 MG/ML
INJECTION, SUSPENSION INTRA-ARTICULAR; INTRAMUSCULAR
Status: DISPENSED
Start: 2025-05-06